# Patient Record
Sex: FEMALE | Race: WHITE | URBAN - METROPOLITAN AREA
[De-identification: names, ages, dates, MRNs, and addresses within clinical notes are randomized per-mention and may not be internally consistent; named-entity substitution may affect disease eponyms.]

---

## 2017-01-03 DIAGNOSIS — M06.09 RHEUMATOID ARTHRITIS OF MULTIPLE SITES WITHOUT RHEUMATOID FACTOR (H): Primary | ICD-10-CM

## 2017-01-03 RX ORDER — AZATHIOPRINE 50 MG/1
100 TABLET ORAL DAILY
Qty: 60 TABLET | Refills: 0 | Status: SHIPPED | OUTPATIENT
Start: 2017-01-03 | End: 2017-02-07

## 2017-01-03 NOTE — TELEPHONE ENCOUNTER
azaTHIOprine (IMURAN) 50 MG tablet      Last Written Prescription Date:  11/15/2016  Last Fill Quantity: 60,   # refills: 1  Last Office Visit: 12/8/2015  Future Office visit:  3/14/2017    CBC RESULTS:   Recent Labs   Lab Test  11/01/16   1507   WBC  4.9   RBC  4.23   HGB  13.3   HCT  39.6   MCV  94   MCH  31.4   MCHC  33.6   RDW  13.5   PLT  165       CREATININE   Date Value Ref Range Status   11/01/2016 0.89 0.57 - 1.11 mg/dL Final   ]    Liver Function Studies -   Recent Labs   Lab Test  11/01/16   1507  03/02/12  10/20/11   1635   PROTTOTAL   --    --    --   7.0   ALBUMIN  3.8   < >  3.4*  3.9   BILITOTAL   --    --    --   1.0   ALKPHOS   --    --   87  79   AST  23   < >  19  33   ALT  16   < >  31  10    < > = values in this interval not displayed.       Routing refill request to provider for review/approval because:  Criteria not met-  appointment overdue > 6 months since last seen.  Will pend Rx to provider per protocol for 1 month supply and no added refills.

## 2017-02-07 ENCOUNTER — TELEPHONE (OUTPATIENT)
Dept: RHEUMATOLOGY | Facility: CLINIC | Age: 52
End: 2017-02-07

## 2017-02-07 ENCOUNTER — MYC MEDICAL ADVICE (OUTPATIENT)
Dept: RHEUMATOLOGY | Facility: CLINIC | Age: 52
End: 2017-02-07

## 2017-02-07 DIAGNOSIS — Z85.828 HISTORY OF BASAL CELL CARCINOMA: ICD-10-CM

## 2017-02-07 DIAGNOSIS — M06.09 RHEUMATOID ARTHRITIS OF MULTIPLE SITES WITHOUT RHEUMATOID FACTOR (H): Primary | ICD-10-CM

## 2017-02-07 DIAGNOSIS — Z76.0 MEDICATION REFILL: ICD-10-CM

## 2017-02-07 RX ORDER — AZATHIOPRINE 50 MG/1
100 TABLET ORAL DAILY
Qty: 60 TABLET | Refills: 1 | Status: SHIPPED | OUTPATIENT
Start: 2017-02-07 | End: 2017-05-25

## 2017-02-07 RX ORDER — METHOTREXATE 25 MG/ML
18 INJECTION INTRA-ARTERIAL; INTRAMUSCULAR; INTRATHECAL; INTRAVENOUS WEEKLY
Qty: 2.8 ML | Refills: 1 | Status: SHIPPED | OUTPATIENT
Start: 2017-02-07 | End: 2017-06-19

## 2017-02-07 RX ORDER — AZATHIOPRINE 50 MG/1
TABLET ORAL
Qty: 120 TABLET | Refills: 0 | OUTPATIENT
Start: 2017-02-07

## 2017-02-07 NOTE — TELEPHONE ENCOUNTER
Please have her do labs 1 hour before she sees me, so I have them available for review.     Alternatively, she could see Lj even before then. She needs to be seen for sure.

## 2017-02-07 NOTE — TELEPHONE ENCOUNTER
Methotrexate      Last Written Prescription Date:  12-9-16  Last Fill Quantity: 2.8 ml,   # refills: 0        Azathioprine      Last Written Prescription Date:  1-3-17  Last Fill Quantity: 60,   # refills: 0  Last Office Visit: 12-8-2015  Future Office visit:  3-14-17    CBC RESULTS:   Recent Labs   Lab Test  11/01/16   1507   WBC  4.9   RBC  4.23   HGB  13.3   HCT  39.6   MCV  94   MCH  31.4   MCHC  33.6   RDW  13.5   PLT  165       CREATININE   Date Value Ref Range Status   11/01/2016 0.89 0.57 - 1.11 mg/dL Final   ]    Liver Function Studies -   Recent Labs   Lab Test  11/01/16   1507  03/02/12  10/20/11   1635   PROTTOTAL   --    --    --   7.0   ALBUMIN  3.8   < >  3.4*  3.9   BILITOTAL   --    --    --   1.0   ALKPHOS   --    --   87  79   AST  23   < >  19  33   ALT  16   < >  31  10    < > = values in this interval not displayed.       Kathleen M Doege RN

## 2017-02-07 NOTE — TELEPHONE ENCOUNTER
[facility] pharmacy called re: MTX Rx. Med comes in 2 mL vials. Pharmacist asking if Rx quantity can be 4, that [facility] call at [phone]. Message sent to [employer].

## 2017-02-13 ENCOUNTER — MYC MEDICAL ADVICE (OUTPATIENT)
Dept: RHEUMATOLOGY | Facility: CLINIC | Age: 52
End: 2017-02-13

## 2017-02-15 DIAGNOSIS — Z85.828 HISTORY OF BASAL CELL CARCINOMA: ICD-10-CM

## 2017-02-15 DIAGNOSIS — M06.9 RHEUMATOID ARTHRITIS (H): ICD-10-CM

## 2017-02-15 DIAGNOSIS — Z76.0 MEDICATION REFILL: ICD-10-CM

## 2017-02-23 ENCOUNTER — MYC MEDICAL ADVICE (OUTPATIENT)
Dept: RHEUMATOLOGY | Facility: CLINIC | Age: 52
End: 2017-02-23

## 2017-02-23 NOTE — TELEPHONE ENCOUNTER
Reschedule to Lj Robledo.  Michelle Rossi CMA (AAMA)  [facility] - Adult Rheumatology, [facility]

## 2017-03-12 ENCOUNTER — MYC MEDICAL ADVICE (OUTPATIENT)
Dept: RHEUMATOLOGY | Facility: CLINIC | Age: 52
End: 2017-03-12

## 2017-03-14 ENCOUNTER — OFFICE VISIT (OUTPATIENT)
Dept: RHEUMATOLOGY | Facility: CLINIC | Age: 52
End: 2017-03-14
Attending: INTERNAL MEDICINE
Payer: COMMERCIAL

## 2017-03-14 VITALS
WEIGHT: 208 LBS | BODY MASS INDEX: 35.7 KG/M2 | HEART RATE: 80 BPM | DIASTOLIC BLOOD PRESSURE: 81 MMHG | SYSTOLIC BLOOD PRESSURE: 145 MMHG | OXYGEN SATURATION: 97 %

## 2017-03-14 DIAGNOSIS — M19.90 INFLAMMATORY ARTHRITIS: ICD-10-CM

## 2017-03-14 DIAGNOSIS — M06.9 RHEUMATOID ARTHRITIS (H): ICD-10-CM

## 2017-03-14 DIAGNOSIS — M70.61 TROCHANTERIC BURSITIS OF RIGHT HIP: ICD-10-CM

## 2017-03-14 DIAGNOSIS — Z79.899 ENCOUNTER FOR LONG-TERM CURRENT USE OF MEDICATION: Primary | ICD-10-CM

## 2017-03-14 DIAGNOSIS — Z79.899 ENCOUNTER FOR LONG-TERM CURRENT USE OF MEDICATION: ICD-10-CM

## 2017-03-14 LAB
ALBUMIN SERPL-MCNC: 3.5 G/DL (ref 3.4–5)
ALT SERPL W P-5'-P-CCNC: 19 U/L (ref 0–50)
AST SERPL W P-5'-P-CCNC: 18 U/L (ref 0–45)
BASOPHILS # BLD AUTO: 0 10E9/L (ref 0–0.2)
BASOPHILS NFR BLD AUTO: 0.7 %
CREAT SERPL-MCNC: 0.92 MG/DL (ref 0.52–1.04)
CRP SERPL-MCNC: 3.8 MG/L (ref 0–8)
DIFFERENTIAL METHOD BLD: NORMAL
EOSINOPHIL # BLD AUTO: 0.2 10E9/L (ref 0–0.7)
EOSINOPHIL NFR BLD AUTO: 3.9 %
ERYTHROCYTE [DISTWIDTH] IN BLOOD BY AUTOMATED COUNT: 13 % (ref 10–15)
ERYTHROCYTE [SEDIMENTATION RATE] IN BLOOD BY WESTERGREN METHOD: 20 MM/H (ref 0–30)
GFR SERPL CREATININE-BSD FRML MDRD: 64 ML/MIN/1.7M2
HCT VFR BLD AUTO: 41.1 % (ref 35–47)
HGB BLD-MCNC: 14 G/DL (ref 11.7–15.7)
IMM GRANULOCYTES # BLD: 0 10E9/L (ref 0–0.4)
IMM GRANULOCYTES NFR BLD: 0.2 %
LYMPHOCYTES # BLD AUTO: 1 10E9/L (ref 0.8–5.3)
LYMPHOCYTES NFR BLD AUTO: 18 %
MCH RBC QN AUTO: 31.3 PG (ref 26.5–33)
MCHC RBC AUTO-ENTMCNC: 34.1 G/DL (ref 31.5–36.5)
MCV RBC AUTO: 92 FL (ref 78–100)
MONOCYTES # BLD AUTO: 0.4 10E9/L (ref 0–1.3)
MONOCYTES NFR BLD AUTO: 7.3 %
NEUTROPHILS # BLD AUTO: 3.7 10E9/L (ref 1.6–8.3)
NEUTROPHILS NFR BLD AUTO: 69.9 %
NRBC # BLD AUTO: 0 10*3/UL
NRBC BLD AUTO-RTO: 0 /100
PLATELET # BLD AUTO: 161 10E9/L (ref 150–450)
RBC # BLD AUTO: 4.47 10E12/L (ref 3.8–5.2)
WBC # BLD AUTO: 5.3 10E9/L (ref 4–11)

## 2017-03-14 PROCEDURE — 85652 RBC SED RATE AUTOMATED: CPT | Performed by: INTERNAL MEDICINE

## 2017-03-14 PROCEDURE — 82565 ASSAY OF CREATININE: CPT | Performed by: INTERNAL MEDICINE

## 2017-03-14 PROCEDURE — 36415 COLL VENOUS BLD VENIPUNCTURE: CPT | Performed by: INTERNAL MEDICINE

## 2017-03-14 PROCEDURE — 86140 C-REACTIVE PROTEIN: CPT | Performed by: INTERNAL MEDICINE

## 2017-03-14 PROCEDURE — 84460 ALANINE AMINO (ALT) (SGPT): CPT | Performed by: INTERNAL MEDICINE

## 2017-03-14 PROCEDURE — 84450 TRANSFERASE (AST) (SGOT): CPT | Performed by: INTERNAL MEDICINE

## 2017-03-14 PROCEDURE — 85025 COMPLETE CBC W/AUTO DIFF WBC: CPT | Performed by: INTERNAL MEDICINE

## 2017-03-14 PROCEDURE — 82040 ASSAY OF SERUM ALBUMIN: CPT | Performed by: INTERNAL MEDICINE

## 2017-03-14 PROCEDURE — 99212 OFFICE O/P EST SF 10 MIN: CPT

## 2017-03-14 ASSESSMENT — PAIN SCALES - GENERAL: PAINLEVEL: NO PAIN (0)

## 2017-03-14 NOTE — PROGRESS NOTES
Rheumatology Visit     Jodie Dailey MRN# [id number]   YOB: 1965 Age: 50 year old     Primary care provider: JEREMIAH VILLAVICENCIO-  Primary Rheumatologist: Dr. Ubaldo Montoya MD   Immunizations: Live vaccines verify with CDC guidelines if safe to take (including shingles vaccine).  Update immunizations per CDC guidelines recommendations for immunosuppressed patients.           Active Problem List:   Patient Active Problem List    Diagnosis Date Noted     History of basal cell carcinoma 12/08/2015     Priority: Medium     Has history of BCC on nose 03/2014, follows with Dermatology        Iron deficiency anemia 03/13/2014     Priority: Medium     Overview:   2/05  Secondary to DUB       Lyme disease 03/13/2014     Priority: Medium     Overview:   Positive - treated -         Restless legs syndrome 03/13/2014     Priority: Medium     Overview:   11/05       Hemorrhoids 03/13/2014     Priority: Medium     Encounter for long-term current use of medication 05/01/2012     Priority: Medium     Problem list name updated by automated process. Provider to review       Chronic sinusitis 10/15/2008     Priority: Medium     Overview:   chronic disease  1/08 Dr. Pierre, sinus CT       Endocrine disorder 03/18/2008     Priority: Medium     Overview:   Replaced at [facility] [place], [place]              Assessment:     1. Sero-positive RA (-RF, -SUSHIL +CCP low positive 60 in 5/2012 outside lab, xrays 3/2014 No erosions)   Assessment: Patient is doing well on current regimen and there is no disease activity on physical exam and repeat labs from 11/30 are normal. Will continue with methotrexate 17.5mg week, aziothioprine 100mg day. If she experiences flares in the future, options would be to stop imuran and try orencia should she flare. Of note, recent basal cell cancer on nose 3-2014-will schedule with derm. Labs 7-2015 NL. See RA measures=low/remission. Based on this, continue current plan  --Methotrexate 17.5mg SQ q7days, aziothioprine 100mg  day, folic acid.   --DMARD labs every 8 weeks. No alcohol. Hold if any infections, fever, chills or cough  --yearly derm visit hx basal cell skin cancer     2. OA of the knees (needs TKR L>R). [facility]    3. History of Pleural Effusions: Stable no symptoms now. Has stable SOB w/going up one flight of stairs. Hx scarring on lungs after past pneumonia.   4. Hx of Basal Cell Carcinoma -    PLAN:  She really seems to be doing very well.  There is at most some trace synovitis in her right wrist.      On exam she has lateral tenderness consistent with right-sided trochanteric bursitis/iliotibial band syndrome.  This is actually pretty mild based on both her symptoms and her exam, so I gave her some printouts of exercises to try.  If that is not working adequately, we will get her set up with Physical Therapy.       She is doing well enough otherwise I think she can continue to do her labs as scheduled and will see Lj, our nurse practitioner, back in 6 months, sooner p.r.n.              History of Present Illness:   Jodie Dailey is a 50 year old female who presents with sero-positive RA (Neg SUSHIL, no erosions on xray 3/2014, -RF/ low positive 60 CCP in 5/2012). Continues with methotrexate 17.5mg week, azothioprine 100mg day [decreased 10/2011 from 150mg day] folic acid, medrol dose pack May 2012. Previous low albumin 2.2 on 10/2011 normalized repeat with nl work-up. Last seen 7-2014 Dr. Montoya  Interval Hx   08/2015    Stable RA and PFT.   EAS none. No vasculitic lesions over the lower extremities. Denies any interval changes, no infections, fever, chills, cough or night sweats. No EAS. Past seen [facility] for knee left meniscal tear-will need TKR and this repaired which does not wish to do. Hx R patella dislocated needed adjustment. No other joint complaints of pains or swelling. Feels RA controlled.     Denies CP, SOB, angina or neurological symptoms. No changing skin lesions. Due for physical but  does think she had DEXA. No change in PMSH. No OTC medications     At times, past scarring from pneumonia will have more SOB with FOS at work. No symptoms now. Cardiac work-up negative 2011. Previously told it was scar tissue in 1999. Notably she quit tobacco over 20 years ago.     12/08/2015     Patient feels that her symptoms are well controlled. She states that she will experience about 15 minutes of joint stiffness 1-2 times per week but has very little pain in her MCPs or PCPs. She has not noted any swelling or erythema of the joints.     She does have a small lesion that broke out under the right nare last week. It was pustular and drained serous fluid. It is getting smaller and healing but is slightly painful to palpation. No other rashes or lesions anywhere else noted.     She states that her baseline shortness of breath is not worse. Her baseline is that she can walk up 1 flight of stairs and she will get a little short of breath.     She states she noted some floaters in her left eye recently. Had eye exam and was told that everything was normal. Do not see report of this. She is not having any pain with eye movement or redness of the eye.     She is endorsing some night sweats but she is currently going through menopause.     No fevers, chills or unintentional weight loss. No dry mouth, dry eyes. No abdominal pain, nausea or vomiting. No diarrhea or constipation. No chest pain. No muscle pain.     MARCH 14, 2017, INTERVAL HISTORY:  Since we have last seen the patient, it has been about 15 months.  She really feels she has been pretty stable overall.  In general, she has had no joint with more than 15 minutes of morning stiffness.  She did have some pain over the last several months, particularly in her right shoulder with certain extremes of range of motion, but she thinks she is doing pretty well today and cannot necessarily reproduce those.  Her right foot will hurt her at times and the most persistent  of her pains is lateral pain over the right hip.  She tends to sleep on that every night, and her description and location of it would be consistent with trochanteric bursitis.       She did also recently get her labs done.  She remains on the methotrexate at 18 mg weekly together with Imuran 100 mg q. day.  On that combination, she has no laboratory abnormalities whatsoever and her labs have been completely stable over the last year.  She also denies any fevers, chills, or signs or symptoms of infection.           Past Medical History:   Past Medical History   Diagnosis Date     Anemia      History.      Basal cell cancer 3/2014     Benign neoplasm of breast 01/28/1999     L     HTN (hypertension)      On treatment     Left ankle injury 11/2010     Twisted ankle (resolved). MRI 11/2010     Left knee DJD 11/2010     Seen orthopedic [facility] (MRI showing DJD needing TKR)     Leucocytoclastic vasculitis (H) 2009     After Humir, no recurrent lesions of sx. Probable drug induced     Nasal polyps      Obesity, unspecified      minimal     Pleural effusions 1999     At initial dx of RA. Not worse. SOB with stairs. Cardiac work-up negative. Past told was from scar tissure in lungs 1999.      Pneumonia 1999     Post term pregnancy, delivered with or without mention of antepartum condition      childbirth x 2     Premenopausal patient      Hot flashes.      Pure hypercholesterolemia      elevated 1998     RA (rheumatoid arthritis) (H) 1999     +anti-CCP 60 5/2012; 3/2008 x-rays no erosions hands/feet     No Blood transfusions         Past Surgical History:   Past Surgical History   Procedure Laterality Date      general surg (abstracted)  0250-4314     right knee surgery ,knee cap     Hc place needle wire preop, breast, initial  01/28/1999     L     C stereotactic breast biopsy  01/28/1999     L     Hc exc benign skin lesion trunk/arm/leg 0.6-1.0 cm  01/28/1999     R axilla     Hc excision breast lesion, open >=1   "01/28/1999     L     Rw other (abstracted)  2001     sinus surgery,polyps removed               Social History:   Social History     Social History     Marital status:      Spouse name: N/A     Number of children: N/A     Years of education: N/A     Occupational History     Not on file.     Social History Main Topics     Smoking status: Never Smoker     Smokeless tobacco: Never Used     Alcohol use No      Comment: rare     Drug use: No     Sexual activity: Not on file     Other Topics Concern     Blood Transfusions No     Sleep Concern No     Stress Concern No     Weight Concern Yes     Special Diet No     Back Care No     Exercise Yes     Seat Belt Yes     Self-Exams Yes     Social History Narrative    Works in airline               Family History:   Family History   Problem Relation Age of Onset     CANCER Maternal Grandmother      Brain, lung     DIABETES No family hx of      10 brothers and sisters      HEART DISEASE Paternal Grandmother      MI, asthma     Other - See Comments Brother      brother and niece with celiac     No autoimmune disorders  No MS         Allergies:   Allergies   Allergen Reactions     No Known Allergies      Seasonal Allergies      Ragweed, dust mites               Medications:   Current Outpatient Prescriptions   Medication Sig Dispense Refill     tuberculin-allergy syringes (B-D TB SYRINGE) 27G X 1/2\" 1 ML MISC USE AS DIRECTED FOR WEEKLY INJECTIONS OF METHOTREXATE 100 each 0     methotrexate sodium, pres-free, 50 MG/2ML SOLN injection CHEMO Inject 0.72 mLs (18 mg) Subcutaneous once a week 2.8 mL 1     azaTHIOprine (IMURAN) 50 MG tablet Take 2 tablets (100 mg) by mouth daily 60 tablet 1     folic acid (FOLVITE) 1 MG tablet Take 1 tablet (1 mg) by mouth 2 times daily 180 tablet 0     hydrochlorothiazide (HYDRODIURIL) 25 MG tablet Take 1 tablet by mouth daily.       Calcium Carbonate-Vitamin D (CALCIUM 500 + D PO) Take 1 tablet by mouth daily.       Cyanocobalamin (VITAMIN B12 PO) " Take 1 tablet by mouth daily.       Pyridoxine HCl (B-6 PO) Take 1 tablet by mouth daily.              Review of Systems: See MDHAQ   Pertinent review of systems are negative for sun sensitivities, keratoconjunctivitis sicca, pleurisy, significant rashes or ulcerations, inflammatory eye disease, inflammatory bowel disease, dactylitis, tenosynovitis, or enthespathy.   CONSTITUTIONAL: No fevers, night sweats or unintentional weight change. No acute distress  EYES: Neg  EARS, NOSE, MOUTH, THROAT: Neg  CARDIOVASCULAR: No chest pain, palpitations, or pain with walking, no orthopnea or PND   RESPIRATORY: No dyspnea, cough, shortness of breath or wheezing. No pleurisy.   GI: Neg  : Neg  MUSCKL: negative other than what stated in HPI  INTEGUMENTARY: No concerning lesions or moles   NEURO: Neg  ENDO: Neg  HEME/LYMPH:No concerning bumps, bleeding problems, or swollen lymph nodes. No recent infections, hospitalizations.   Otherwise 14 point ROS obtained, reviewed and found negative.           Physical Exam:   Blood pressure 145/81, pulse 80, weight 94.3 kg (208 lb), SpO2 97 %.  Wt Readings from Last 4 Encounters:   03/14/17 94.3 kg (208 lb)   12/08/15 84.4 kg (186 lb)   08/18/15 81.6 kg (180 lb)   12/02/14 86.4 kg (190 lb 6.4 oz)     Constitutional: WD-WN-WG cooperative  Eyes: nl EOM, PERRLA, vision, conjunctiva, sclera  ENT: nl external ears, nose, hearing, lips, teeth, gums, throat. Nl saliva pool  No mucous membrane lesions, normal saliva pool, small erythematous, healing lesion under the right nare without drainage  Neck: no mass or thyroid enlargement. non-tender.  Resp: lungs clear to auscultation, nl to palpation, nl breath sounds   CV: RRR, no murmurs, rubs or gallops, no edema  GI: no ABD mass or tenderness, no HSM. No RUQ pain.   : not tested  Lymph: no cervical, supraclavicular, inguinal or epitrochlear nodes  MS: Trace right wrist swelling; No other  convincing areas of synovitis in all TMJ, neck, shoulder,  elbow, wrist, MCP/PIP/DIP, spine, hip, knee, ankle, and foot MTP/IP joints were examined no swelling and FROM.  Normal  strength. No dactylitis,  tenosynovitis, enthespathy. Negative MCP and MTP squeeze.   Skin: no nail pitting, alopecia, rash, nodules. Chronic hyperpigmentation changes from old vasculitis.    Neuro: nl cranial nerves, strength, sensation  Psych: nl judgement, orientation, memory, affect.         Labs/Imaging:   Reviewed medications, labs, imaging, and EMR.   Reviewed Rheumatology Lab Flowsheet & Lab Flowsheet    11/30/2105 done [facility] CBC, creat, ALT, AST, CRP and ESR    Results for orders placed or performed in visit on 03/14/17   Albumin level   Result Value Ref Range    Albumin 3.5 3.4 - 5.0 g/dL   ALT   Result Value Ref Range    ALT 19 0 - 50 U/L   AST   Result Value Ref Range    AST 18 0 - 45 U/L   CBC with platelets differential   Result Value Ref Range    WBC 5.3 4.0 - 11.0 10e9/L    RBC Count 4.47 3.8 - 5.2 10e12/L    Hemoglobin 14.0 11.7 - 15.7 g/dL    Hematocrit 41.1 35.0 - 47.0 %    MCV 92 78 - 100 fl    MCH 31.3 26.5 - 33.0 pg    MCHC 34.1 31.5 - 36.5 g/dL    RDW 13.0 10.0 - 15.0 %    Platelet Count 161 150 - 450 10e9/L    Diff Method Automated Method     % Neutrophils 69.9 %    % Lymphocytes 18.0 %    % Monocytes 7.3 %    % Eosinophils 3.9 %    % Basophils 0.7 %    % Immature Granulocytes 0.2 %    Nucleated RBCs 0 0 /100    Absolute Neutrophil 3.7 1.6 - 8.3 10e9/L    Absolute Lymphocytes 1.0 0.8 - 5.3 10e9/L    Absolute Monocytes 0.4 0.0 - 1.3 10e9/L    Absolute Eosinophils 0.2 0.0 - 0.7 10e9/L    Absolute Basophils 0.0 0.0 - 0.2 10e9/L    Abs Immature Granulocytes 0.0 0 - 0.4 10e9/L    Absolute Nucleated RBC 0.0    Creatinine   Result Value Ref Range    Creatinine 0.92 0.52 - 1.04 mg/dL    GFR Estimate 64 >60 mL/min/1.7m2    GFR Estimate If Black 77 >60 mL/min/1.7m2   Erythrocyte sedimentation rate auto   Result Value Ref Range    Sed Rate 20 0 - 30 mm/h   CRP inflammation    Result Value Ref Range    CRP Inflammation 3.8 0.0 - 8.0 mg/L       Recent Labs   Lab Test  03/14/17   1439  11/01/16   1507  06/23/16   1444   10/28/13   1103  03/02/12  10/20/11   1635 10/17/11   WBC  5.3  4.9  6.0   < >  4.6   < >  8.1  6.1  6.4   HGB  14.0  13.3  13.9   < >  13.7   < >  13.9  13.7  13.8   HCT  41.1  39.6  40.1   < >  39.3   < >  39.5  39.4  40.3   MCV  92  94  93   < >  93   < >  89  93  92   PLT  161  165  157   < >  146*   < >  157  164  159   BUN   --    --    --    --   15   --    --    --    --    AST  18  23  23   < >  34   < >  19  33  24*   ALT  19  16  13   < >  40   < >  31  10  9*   GGT   --    --    --    --    --    --    --   11   --    ALKPHOS   --    --    --    --    --    --   87  79  86    < > = values in this interval not displayed.

## 2017-03-14 NOTE — NURSING NOTE
"Chief Complaint   Patient presents with     RECHECK     RA       Initial /81  Pulse 80  Wt 94.3 kg (208 lb)  SpO2 97%  BMI 35.7 kg/m2 Estimated body mass index is 35.7 kg/(m^2) as calculated from the following:    Height as of 12/8/15: 1.626 m (5' 4\").    Weight as of this encounter: 94.3 kg (208 lb).  Medication Reconciliation: complete    "

## 2017-03-14 NOTE — MR AVS SNAPSHOT
After Visit Summary   3/14/2017    Jodie Dailey    MRN: [id number]           Patient Information     Date Of Birth          1965        Visit Information        Provider Department      3/14/2017 3:30 PM Ubaldo Montoya MD [facility]         Follow-ups after your visit        Your next 10 appointments already scheduled     Sep 14, 2017  2:30 PM CDT   (Arrive by 2:15 PM)   Return Visit with QI Narayanan CNP   [facility] ([facility])    [address] [place] [id number] [phone]              Who to contact     If you have questions or need follow up information about today's clinic visit or your schedule please contact [facility] directly at [phone].  Normal or non-critical lab and imaging results will be communicated to you by MyChart, letter or phone within 4 business days after the clinic has received the results. If you do not hear from us within 7 days, please contact the clinic through MyChart or phone. If you have a critical or abnormal lab result, we will notify you by phone as soon as possible.  Submit refill requests through [employer] or call your pharmacy and they will forward the refill request to us. Please allow 3 business days for your refill to be completed.          Additional Information About Your Visit        MyChart Information     [employer] gives you secure access to your electronic health record. If you see a primary care provider, you can also send messages to your care team and make appointments. If you have questions, please call your primary care clinic.  If you do not have a primary care provider, please call [phone] and they will assist you.        Care EveryWhere ID     This is your Care EveryWhere ID. This could be used by other organizations to access your [facility] medical records  [id number]        Your Vitals Were     Pulse Pulse Oximetry BMI (Body Mass  "Index)             80 97% 35.7 kg/m2          Blood Pressure from Last 3 Encounters:   03/14/17 145/81   12/08/15 184/86   08/18/15 146/87    Weight from Last 3 Encounters:   03/14/17 94.3 kg (208 lb)   12/08/15 84.4 kg (186 lb)   08/18/15 81.6 kg (180 lb)              Today, you had the following     No orders found for display       Primary Care Provider Office Phone # Fax #    Rommel Hector [phone]       [facility] [address] [address]        Thank you!     Thank you for choosing [facility]  for your care. Our goal is always to provide you with excellent care. Hearing back from our patients is one way we can continue to improve our services. Please take a few minutes to complete the written survey that you may receive in the mail after your visit with us. Thank you!             Your Updated Medication List - Protect others around you: Learn how to safely use, store and throw away your medicines at www.disposemymeds.org.          This list is accurate as of: 3/14/17  4:02 PM.  Always use your most recent med list.                   Brand Name Dispense Instructions for use    azaTHIOprine 50 MG tablet    IMURAN    60 tablet    Take 2 tablets (100 mg) by mouth daily       B-6 PO      Take 1 tablet by mouth daily.       CALCIUM 500 + D PO      Take 1 tablet by mouth daily.       folic acid 1 MG tablet    FOLVITE    180 tablet    Take 1 tablet (1 mg) by mouth 2 times daily       hydrochlorothiazide 25 MG tablet    HYDRODIURIL     Take 1 tablet by mouth daily.       methotrexate sodium (pres-free) 50 MG/2ML Soln injection CHEMO     2.8 mL    Inject 0.72 mLs (18 mg) Subcutaneous once a week       tuberculin-allergy syringes 27G X 1/2\" 1 ML Misc    B-D TB SYRINGE    100 each    USE AS DIRECTED FOR WEEKLY INJECTIONS OF METHOTREXATE       VITAMIN B12 PO      Take 1 tablet by mouth daily.         "

## 2017-03-14 NOTE — LETTER
3/14/2017      RE: Jodie Dailey  [address]  [place] [address]       Rheumatology Visit     Jodie Dailey MRN# [id number]   YOB: 1965 Age: 50 year old     Primary care provider: JEREMIAH VILLAVICENCIO-  Primary Rheumatologist: Dr. Ubaldo Montoya MD   Immunizations: Live vaccines verify with CDC guidelines if safe to take (including shingles vaccine).  Update immunizations per CDC guidelines recommendations for immunosuppressed patients.           Active Problem List:   Patient Active Problem List    Diagnosis Date Noted     History of basal cell carcinoma 12/08/2015     Priority: Medium     Has history of BCC on nose 03/2014, follows with Dermatology        Iron deficiency anemia 03/13/2014     Priority: Medium     Overview:   2/05  Secondary to DUB       Lyme disease 03/13/2014     Priority: Medium     Overview:   Positive - treated -         Restless legs syndrome 03/13/2014     Priority: Medium     Overview:   11/05       Hemorrhoids 03/13/2014     Priority: Medium     Encounter for long-term current use of medication 05/01/2012     Priority: Medium     Problem list name updated by automated process. Provider to review       Chronic sinusitis 10/15/2008     Priority: Medium     Overview:   chronic disease  1/08 Dr. Pierre, sinus CT       Endocrine disorder 03/18/2008     Priority: Medium     Overview:   Replaced at [facility]              Assessment:     1. Sero-positive RA (-RF, -SUSHIL +CCP low positive 60 in 5/2012 outside lab, xrays 3/2014 No erosions)   Assessment: Patient is doing well on current regimen and there is no disease activity on physical exam and repeat labs from 11/30 are normal. Will continue with methotrexate 17.5mg week, aziothioprine 100mg day. If she experiences flares in the future, options would be to stop imuran and try orencia should she flare. Of note, recent basal cell cancer on nose 3-2014-will schedule with derm. Labs 7-2015 NL. See RA measures=low/remission. Based on  this, continue current plan  --Methotrexate 17.5mg SQ q7days, aziothioprine 100mg day, folic acid.   --DMARD labs every 8 weeks. No alcohol. Hold if any infections, fever, chills or cough  --yearly derm visit hx basal cell skin cancer     2. OA of the knees (needs TKR L>R). [facility]    3. History of Pleural Effusions: Stable no symptoms now. Has stable SOB w/going up one flight of stairs. Hx scarring on lungs after past pneumonia.   4. Hx of Basal Cell Carcinoma -    PLAN:  She really seems to be doing very well.  There is at most some trace synovitis in her right wrist.      On exam she has lateral tenderness consistent with right-sided trochanteric bursitis/iliotibial band syndrome.  This is actually pretty mild based on both her symptoms and her exam, so I gave her some printouts of exercises to try.  If that is not working adequately, we will get her set up with Physical Therapy.       She is doing well enough otherwise I think she can continue to do her labs as scheduled and will see Lj, our nurse practitioner, back in 6 months, sooner p.r.n.              History of Present Illness:   Jodie Dailey is a 50 year old female who presents with sero-positive RA (Neg SUSHIL, no erosions on xray 3/2014, -RF/ low positive 60 CCP in 5/2012). Continues with methotrexate 17.5mg week, azothioprine 100mg day [decreased 10/2011 from 150mg day] folic acid, medrol dose pack May 2012. Previous low albumin 2.2 on 10/2011 normalized repeat with nl work-up. Last seen 7-2014 Dr. Montoya  Interval Hx   08/2015    Stable RA and PFT.   EAS none. No vasculitic lesions over the lower extremities. Denies any interval changes, no infections, fever, chills, cough or night sweats. No EAS. Past seen [facility] for knee left meniscal tear-will need TKR and this repaired which does not wish to do. Hx R patella dislocated needed adjustment. No other joint complaints of pains or swelling. Feels RA controlled.     Denies CP, SOB,  angina or neurological symptoms. No changing skin lesions. Due for physical but does think she had DEXA. No change in PMSH. No OTC medications     At times, past scarring from pneumonia will have more SOB with FOS at work. No symptoms now. Cardiac work-up negative 2011. Previously told it was scar tissue in 1999. Notably she quit tobacco over 20 years ago.     12/08/2015     Patient feels that her symptoms are well controlled. She states that she will experience about 15 minutes of joint stiffness 1-2 times per week but has very little pain in her MCPs or PCPs. She has not noted any swelling or erythema of the joints.     She does have a small lesion that broke out under the right nare last week. It was pustular and drained serous fluid. It is getting smaller and healing but is slightly painful to palpation. No other rashes or lesions anywhere else noted.     She states that her baseline shortness of breath is not worse. Her baseline is that she can walk up 1 flight of stairs and she will get a little short of breath.     She states she noted some floaters in her left eye recently. Had eye exam and was told that everything was normal. Do not see report of this. She is not having any pain with eye movement or redness of the eye.     She is endorsing some night sweats but she is currently going through menopause.     No fevers, chills or unintentional weight loss. No dry mouth, dry eyes. No abdominal pain, nausea or vomiting. No diarrhea or constipation. No chest pain. No muscle pain.     MARCH 14, 2017, INTERVAL HISTORY:  Since we have last seen the patient, it has been about 15 months.  She really feels she has been pretty stable overall.  In general, she has had no joint with more than 15 minutes of morning stiffness.  She did have some pain over the last several months, particularly in her right shoulder with certain extremes of range of motion, but she thinks she is doing pretty well today and cannot necessarily  reproduce those.  Her right foot will hurt her at times and the most persistent of her pains is lateral pain over the right hip.  She tends to sleep on that every night, and her description and location of it would be consistent with trochanteric bursitis.       She did also recently get her labs done.  She remains on the methotrexate at 18 mg weekly together with Imuran 100 mg q. day.  On that combination, she has no laboratory abnormalities whatsoever and her labs have been completely stable over the last year.  She also denies any fevers, chills, or signs or symptoms of infection.           Past Medical History:   Past Medical History   Diagnosis Date     Anemia      History.      Basal cell cancer 3/2014     Benign neoplasm of breast 01/28/1999     L     HTN (hypertension)      On treatment     Left ankle injury 11/2010     Twisted ankle (resolved). MRI 11/2010     Left knee DJD 11/2010     Seen orthopedic [facility] (MRI showing DJD needing TKR)     Leucocytoclastic vasculitis (H) 2009     After Humir, no recurrent lesions of sx. Probable drug induced     Nasal polyps      Obesity, unspecified      minimal     Pleural effusions 1999     At initial dx of RA. Not worse. SOB with stairs. Cardiac work-up negative. Past told was from scar tissure in lungs 1999.      Pneumonia 1999     Post term pregnancy, delivered with or without mention of antepartum condition      childbirth x 2     Premenopausal patient      Hot flashes.      Pure hypercholesterolemia      elevated 1998     RA (rheumatoid arthritis) (H) 1999     +anti-CCP 60 5/2012; 3/2008 x-rays no erosions hands/feet     No Blood transfusions         Past Surgical History:   Past Surgical History   Procedure Laterality Date     Rw general surg (abstracted)  [id number]     right knee surgery ,knee cap     Hc place needle wire preop, breast, initial  01/28/1999     L     C stereotactic breast biopsy  01/28/1999     L     Hc exc benign skin lesion trunk/arm/leg  "0.6-1.0 cm  01/28/1999     R axilla     Hc excision breast lesion, open >=1  01/28/1999     L     Rw other (abstracted)  2001     sinus surgery,polyps removed               Social History:   Social History     Social History     Marital status:      Spouse name: N/A     Number of children: N/A     Years of education: N/A     Occupational History     Not on file.     Social History Main Topics     Smoking status: Never Smoker     Smokeless tobacco: Never Used     Alcohol use No      Comment: rare     Drug use: No     Sexual activity: Not on file     Other Topics Concern     Blood Transfusions No     Sleep Concern No     Stress Concern No     Weight Concern Yes     Special Diet No     Back Care No     Exercise Yes     Seat Belt Yes     Self-Exams Yes     Social History Narrative    Works in airline               Family History:   Family History   Problem Relation Age of Onset     CANCER Maternal Grandmother      Brain, lung     DIABETES No family hx of      10 brothers and sisters      HEART DISEASE Paternal Grandmother      MI, asthma     Other - See Comments Brother      brother and niece with celiac     No autoimmune disorders  No MS         Allergies:   Allergies   Allergen Reactions     No Known Allergies      Seasonal Allergies      Ragweed, dust mites               Medications:   Current Outpatient Prescriptions   Medication Sig Dispense Refill     tuberculin-allergy syringes (B-D TB SYRINGE) 27G X 1/2\" 1 ML MISC USE AS DIRECTED FOR WEEKLY INJECTIONS OF METHOTREXATE 100 each 0     methotrexate sodium, pres-free, 50 MG/2ML SOLN injection CHEMO Inject 0.72 mLs (18 mg) Subcutaneous once a week 2.8 mL 1     azaTHIOprine (IMURAN) 50 MG tablet Take 2 tablets (100 mg) by mouth daily 60 tablet 1     folic acid (FOLVITE) 1 MG tablet Take 1 tablet (1 mg) by mouth 2 times daily 180 tablet 0     hydrochlorothiazide (HYDRODIURIL) 25 MG tablet Take 1 tablet by mouth daily.       Calcium Carbonate-Vitamin D (CALCIUM " 500 + D PO) Take 1 tablet by mouth daily.       Cyanocobalamin (VITAMIN B12 PO) Take 1 tablet by mouth daily.       Pyridoxine HCl (B-6 PO) Take 1 tablet by mouth daily.              Review of Systems: See MDHAQ   Pertinent review of systems are negative for sun sensitivities, keratoconjunctivitis sicca, pleurisy, significant rashes or ulcerations, inflammatory eye disease, inflammatory bowel disease, dactylitis, tenosynovitis, or enthespathy.   CONSTITUTIONAL: No fevers, night sweats or unintentional weight change. No acute distress  EYES: Neg  EARS, NOSE, MOUTH, THROAT: Neg  CARDIOVASCULAR: No chest pain, palpitations, or pain with walking, no orthopnea or PND   RESPIRATORY: No dyspnea, cough, shortness of breath or wheezing. No pleurisy.   GI: Neg  : Neg  MUSCKL: negative other than what stated in HPI  INTEGUMENTARY: No concerning lesions or moles   NEURO: Neg  ENDO: Neg  HEME/LYMPH:No concerning bumps, bleeding problems, or swollen lymph nodes. No recent infections, hospitalizations.   Otherwise 14 point ROS obtained, reviewed and found negative.           Physical Exam:   Blood pressure 145/81, pulse 80, weight 94.3 kg (208 lb), SpO2 97 %.  Wt Readings from Last 4 Encounters:   03/14/17 94.3 kg (208 lb)   12/08/15 84.4 kg (186 lb)   08/18/15 81.6 kg (180 lb)   12/02/14 86.4 kg (190 lb 6.4 oz)     Constitutional: WD-WN-WG cooperative  Eyes: nl EOM, PERRLA, vision, conjunctiva, sclera  ENT: nl external ears, nose, hearing, lips, teeth, gums, throat. Nl saliva pool  No mucous membrane lesions, normal saliva pool, small erythematous, healing lesion under the right nare without drainage  Neck: no mass or thyroid enlargement. non-tender.  Resp: lungs clear to auscultation, nl to palpation, nl breath sounds   CV: RRR, no murmurs, rubs or gallops, no edema  GI: no ABD mass or tenderness, no HSM. No RUQ pain.   : not tested  Lymph: no cervical, supraclavicular, inguinal or epitrochlear nodes  MS: Trace right wrist  swelling; No other  convincing areas of synovitis in all TMJ, neck, shoulder, elbow, wrist, MCP/PIP/DIP, spine, hip, knee, ankle, and foot MTP/IP joints were examined no swelling and FROM.  Normal  strength. No dactylitis,  tenosynovitis, enthespathy. Negative MCP and MTP squeeze.   Skin: no nail pitting, alopecia, rash, nodules. Chronic hyperpigmentation changes from old vasculitis.    Neuro: nl cranial nerves, strength, sensation  Psych: nl judgement, orientation, memory, affect.         Labs/Imaging:   Reviewed medications, labs, imaging, and EMR.   Reviewed Rheumatology Lab Flowsheet & Lab Flowsheet    11/30/2105 done [facility] CBC, creat, ALT, AST, CRP and ESR    Results for orders placed or performed in visit on 03/14/17   Albumin level   Result Value Ref Range    Albumin 3.5 3.4 - 5.0 g/dL   ALT   Result Value Ref Range    ALT 19 0 - 50 U/L   AST   Result Value Ref Range    AST 18 0 - 45 U/L   CBC with platelets differential   Result Value Ref Range    WBC 5.3 4.0 - 11.0 10e9/L    RBC Count 4.47 3.8 - 5.2 10e12/L    Hemoglobin 14.0 11.7 - 15.7 g/dL    Hematocrit 41.1 35.0 - 47.0 %    MCV 92 78 - 100 fl    MCH 31.3 26.5 - 33.0 pg    MCHC 34.1 31.5 - 36.5 g/dL    RDW 13.0 10.0 - 15.0 %    Platelet Count 161 150 - 450 10e9/L    Diff Method Automated Method     % Neutrophils 69.9 %    % Lymphocytes 18.0 %    % Monocytes 7.3 %    % Eosinophils 3.9 %    % Basophils 0.7 %    % Immature Granulocytes 0.2 %    Nucleated RBCs 0 0 /100    Absolute Neutrophil 3.7 1.6 - 8.3 10e9/L    Absolute Lymphocytes 1.0 0.8 - 5.3 10e9/L    Absolute Monocytes 0.4 0.0 - 1.3 10e9/L    Absolute Eosinophils 0.2 0.0 - 0.7 10e9/L    Absolute Basophils 0.0 0.0 - 0.2 10e9/L    Abs Immature Granulocytes 0.0 0 - 0.4 10e9/L    Absolute Nucleated RBC 0.0    Creatinine   Result Value Ref Range    Creatinine 0.92 0.52 - 1.04 mg/dL    GFR Estimate 64 >60 mL/min/1.7m2    GFR Estimate If Black 77 >60 mL/min/1.7m2   Erythrocyte sedimentation rate  auto   Result Value Ref Range    Sed Rate 20 0 - 30 mm/h   CRP inflammation   Result Value Ref Range    CRP Inflammation 3.8 0.0 - 8.0 mg/L       Recent Labs   Lab Test  03/14/17   1439  11/01/16   1507  06/23/16   1444   10/28/13   1103  03/02/12  10/20/11   1635 10/17/11   WBC  5.3  4.9  6.0   < >  4.6   < >  8.1  6.1  6.4   HGB  14.0  13.3  13.9   < >  13.7   < >  13.9  13.7  13.8   HCT  41.1  39.6  40.1   < >  39.3   < >  39.5  39.4  40.3   MCV  92  94  93   < >  93   < >  89  93  92   PLT  161  165  157   < >  146*   < >  157  164  159   BUN   --    --    --    --   15   --    --    --    --    AST  18  23  23   < >  34   < >  19  33  24*   ALT  19  16  13   < >  40   < >  31  10  9*   GGT   --    --    --    --    --    --    --   11   --    ALKPHOS   --    --    --    --    --    --   87  79  86    < > = values in this interval not displayed.   Ubaldo Montoya MD

## 2017-04-17 DIAGNOSIS — M06.09 RHEUMATOID ARTHRITIS OF MULTIPLE SITES WITHOUT RHEUMATOID FACTOR (H): ICD-10-CM

## 2017-04-18 RX ORDER — AZATHIOPRINE 50 MG/1
100 TABLET ORAL DAILY
Qty: 120 TABLET | Refills: 0 | Status: SHIPPED | OUTPATIENT
Start: 2017-04-18 | End: 2017-05-25

## 2017-04-18 NOTE — TELEPHONE ENCOUNTER
azaTHIOprine (IMURAN) 50 MG      Last Written Prescription Date:  2/7/17  Last Fill Quantity: 60,   # refills: 1  Last Office Visit: 3/14/17  Future Office visit:  9/14/17    CBC RESULTS:   Recent Labs   Lab Test  03/14/17   1439   WBC  5.3   RBC  4.47   HGB  14.0   HCT  41.1   MCV  92   MCH  31.3   MCHC  34.1   RDW  13.0   PLT  161       Creatinine   Date Value Ref Range Status   03/14/2017 0.92 0.52 - 1.04 mg/dL Final   ]    Liver Function Studies -   Recent Labs   Lab Test  03/14/17   1439  03/02/12  10/20/11   1635   PROTTOTAL   --    --    --   7.0   ALBUMIN  3.5   < >  3.4*  3.9   BILITOTAL   --    --    --   1.0   ALKPHOS   --    --   87  79   AST  18   < >  19  33   ALT  19   < >  31  10    < > = values in this interval not displayed.

## 2017-05-25 DIAGNOSIS — M06.09 RHEUMATOID ARTHRITIS OF MULTIPLE SITES WITHOUT RHEUMATOID FACTOR (H): ICD-10-CM

## 2017-05-25 RX ORDER — AZATHIOPRINE 50 MG/1
100 TABLET ORAL DAILY
Qty: 180 TABLET | Refills: 0 | Status: SHIPPED | OUTPATIENT
Start: 2017-05-25 | End: 2018-03-01

## 2017-05-25 NOTE — TELEPHONE ENCOUNTER
azaTHIOprine (IMURAN) 50 MG tablet   Last Written Prescription Date:  4/18/17  Last Fill Quantity: 120,   # refills: 0  [facility] prescribing provider:  3/14/17  Future Office visit:   9/14/17  Lab Results   Component Value Date    WBC 5.3 03/14/2017     Lab Results   Component Value Date    RBC 4.47 03/14/2017     Lab Results   Component Value Date    HGB 14.0 03/14/2017     Lab Results   Component Value Date    HCT 41.1 03/14/2017     No components found for: MCT  Lab Results   Component Value Date    MCV 92 03/14/2017     Lab Results   Component Value Date    MCH 31.3 03/14/2017     Lab Results   Component Value Date    MCHC 34.1 03/14/2017     Lab Results   Component Value Date    RDW 13.0 03/14/2017     Lab Results   Component Value Date     03/14/2017     Lab Results   Component Value Date    ALT 19 03/14/2017     Lab Results   Component Value Date    ALBUMIN 3.5 03/14/2017         Routing refill request to provider for review/approval because:   azaTHIOprine (IMURAN) 50 MG tablet.

## 2017-06-19 DIAGNOSIS — M06.9 RHEUMATOID ARTHRITIS (H): ICD-10-CM

## 2017-06-19 DIAGNOSIS — M06.09 RHEUMATOID ARTHRITIS OF MULTIPLE SITES WITHOUT RHEUMATOID FACTOR (H): ICD-10-CM

## 2017-06-19 NOTE — LETTER
June 20, 2017      TO: Jodie Dailey  [address] [facility] [place] [address]         Dear MsDanya DoloresYG Dailey,    This letter is a reminder that you are overdue for needed labs. You must have appropriate labs drawn every 8-12 weeks for prescription refills.  LAST LABS 3/14/17    You have been given a 30 day supply/refill of your METHOTREXAT (PF) 50MG/2ML INJ while you get your labs completed.    Regards,  Rheumatology Clinic Staff

## 2017-06-20 RX ORDER — METHOTREXATE 25 MG/ML
18 INJECTION INTRA-ARTERIAL; INTRAMUSCULAR; INTRATHECAL; INTRAVENOUS WEEKLY
Qty: 2.8 ML | Refills: 0 | Status: SHIPPED | OUTPATIENT
Start: 2017-06-20 | End: 2017-11-29

## 2017-06-20 RX ORDER — FOLIC ACID 1 MG/1
1 TABLET ORAL 2 TIMES DAILY
Qty: 180 TABLET | Refills: 3 | Status: SHIPPED | OUTPATIENT
Start: 2017-06-20 | End: 2018-03-07

## 2017-06-20 NOTE — TELEPHONE ENCOUNTER
folic acid 1 MG       Last Written Prescription Date:  7/19/16  Last Fill Quantity: 180,   # refills: 0  Last Office Visit : 3/14/17  Future Office visit:  9/14/17

## 2017-06-20 NOTE — TELEPHONE ENCOUNTER
methotrexate      Last Written Prescription Date:  2/7/17  Last Fill Quantity: 2.8ml,   # refills: 1  Last Office Visit : 3/14/17  Future Office visit:  9/14/17     CBC RESULTS:   Recent Labs   Lab Test  03/14/17   1439   WBC  5.3   RBC  4.47   HGB  14.0   HCT  41.1   MCV  92   MCH  31.3   MCHC  34.1   RDW  13.0   PLT  161       Creatinine   Date Value Ref Range Status   03/14/2017 0.92 0.52 - 1.04 mg/dL Final   ]    Liver Function Studies -   Recent Labs   Lab Test  03/14/17   1439  03/02/12  10/20/11   1635   PROTTOTAL   --    --    --   7.0   ALBUMIN  3.5   < >  3.4*  3.9   BILITOTAL   --    --    --   1.0   ALKPHOS   --    --   87  79   AST  18   < >  19  33   ALT  19   < >  31  10    < > = values in this interval not displayed.       Routing refill request to provider for review/approval because:  LABS DUE  REMINDER LETTER TO PT

## 2017-07-12 ENCOUNTER — MYC MEDICAL ADVICE (OUTPATIENT)
Dept: RHEUMATOLOGY | Facility: CLINIC | Age: 52
End: 2017-07-12

## 2017-07-20 NOTE — TELEPHONE ENCOUNTER
Writer faxed lab orders to desired clinic and left patient a voice mail and [employer] message.   Tisha Bui LPN

## 2017-07-22 ENCOUNTER — HEALTH MAINTENANCE LETTER (OUTPATIENT)
Age: 52
End: 2017-07-22

## 2017-10-05 ENCOUNTER — OFFICE VISIT (OUTPATIENT)
Dept: RHEUMATOLOGY | Facility: CLINIC | Age: 52
End: 2017-10-05
Attending: NURSE PRACTITIONER
Payer: COMMERCIAL

## 2017-10-05 ENCOUNTER — TELEPHONE (OUTPATIENT)
Dept: RHEUMATOLOGY | Facility: CLINIC | Age: 52
End: 2017-10-05

## 2017-10-05 VITALS
SYSTOLIC BLOOD PRESSURE: 146 MMHG | OXYGEN SATURATION: 98 % | BODY MASS INDEX: 34.99 KG/M2 | WEIGHT: 210 LBS | DIASTOLIC BLOOD PRESSURE: 85 MMHG | HEART RATE: 77 BPM | HEIGHT: 65 IN

## 2017-10-05 DIAGNOSIS — Z79.899 ENCOUNTER FOR LONG-TERM CURRENT USE OF MEDICATION: ICD-10-CM

## 2017-10-05 DIAGNOSIS — M05.9 SEROPOSITIVE RHEUMATOID ARTHRITIS (H): Primary | ICD-10-CM

## 2017-10-05 LAB
ALBUMIN SERPL-MCNC: 3.5 G/DL (ref 3.4–5)
ALT SERPL W P-5'-P-CCNC: 23 U/L (ref 0–50)
AST SERPL W P-5'-P-CCNC: 24 U/L (ref 0–45)
BASOPHILS # BLD AUTO: 0.1 10E9/L (ref 0–0.2)
BASOPHILS NFR BLD AUTO: 1 %
CREAT SERPL-MCNC: 0.81 MG/DL (ref 0.52–1.04)
CRP SERPL-MCNC: 3.3 MG/L (ref 0–8)
DIFFERENTIAL METHOD BLD: NORMAL
EOSINOPHIL # BLD AUTO: 0.3 10E9/L (ref 0–0.7)
EOSINOPHIL NFR BLD AUTO: 5.8 %
ERYTHROCYTE [DISTWIDTH] IN BLOOD BY AUTOMATED COUNT: 12.9 % (ref 10–15)
ERYTHROCYTE [SEDIMENTATION RATE] IN BLOOD BY WESTERGREN METHOD: 14 MM/H (ref 0–30)
GFR SERPL CREATININE-BSD FRML MDRD: 75 ML/MIN/1.7M2
HCT VFR BLD AUTO: 40.8 % (ref 35–47)
HGB BLD-MCNC: 13.8 G/DL (ref 11.7–15.7)
IMM GRANULOCYTES # BLD: 0 10E9/L (ref 0–0.4)
IMM GRANULOCYTES NFR BLD: 0.2 %
LYMPHOCYTES # BLD AUTO: 0.9 10E9/L (ref 0.8–5.3)
LYMPHOCYTES NFR BLD AUTO: 17.3 %
MCH RBC QN AUTO: 31.4 PG (ref 26.5–33)
MCHC RBC AUTO-ENTMCNC: 33.8 G/DL (ref 31.5–36.5)
MCV RBC AUTO: 93 FL (ref 78–100)
MONOCYTES # BLD AUTO: 0.4 10E9/L (ref 0–1.3)
MONOCYTES NFR BLD AUTO: 7 %
NEUTROPHILS # BLD AUTO: 3.5 10E9/L (ref 1.6–8.3)
NEUTROPHILS NFR BLD AUTO: 68.7 %
NRBC # BLD AUTO: 0 10*3/UL
NRBC BLD AUTO-RTO: 0 /100
PLATELET # BLD AUTO: 160 10E9/L (ref 150–450)
RBC # BLD AUTO: 4.39 10E12/L (ref 3.8–5.2)
WBC # BLD AUTO: 5.1 10E9/L (ref 4–11)

## 2017-10-05 PROCEDURE — 36415 COLL VENOUS BLD VENIPUNCTURE: CPT | Performed by: NURSE PRACTITIONER

## 2017-10-05 PROCEDURE — 82565 ASSAY OF CREATININE: CPT | Performed by: NURSE PRACTITIONER

## 2017-10-05 PROCEDURE — 84450 TRANSFERASE (AST) (SGOT): CPT | Performed by: NURSE PRACTITIONER

## 2017-10-05 PROCEDURE — 99212 OFFICE O/P EST SF 10 MIN: CPT | Mod: ZF

## 2017-10-05 PROCEDURE — 84460 ALANINE AMINO (ALT) (SGPT): CPT | Performed by: NURSE PRACTITIONER

## 2017-10-05 PROCEDURE — 86140 C-REACTIVE PROTEIN: CPT | Performed by: NURSE PRACTITIONER

## 2017-10-05 PROCEDURE — 85652 RBC SED RATE AUTOMATED: CPT | Performed by: NURSE PRACTITIONER

## 2017-10-05 PROCEDURE — 85025 COMPLETE CBC W/AUTO DIFF WBC: CPT | Performed by: NURSE PRACTITIONER

## 2017-10-05 PROCEDURE — 82040 ASSAY OF SERUM ALBUMIN: CPT | Performed by: NURSE PRACTITIONER

## 2017-10-05 ASSESSMENT — PAIN SCALES - GENERAL: PAINLEVEL: MODERATE PAIN (4)

## 2017-10-05 NOTE — TELEPHONE ENCOUNTER
Left message with patient to return call to clinic. Lab orders are pending per Lj Robledo, appointment can be scheduled.   Tisha Bui LPN

## 2017-10-05 NOTE — MR AVS SNAPSHOT
After Visit Summary   10/5/2017    Jodie Dailey    MRN: [id number]           Patient Information     Date Of Birth          1965        Visit Information        Provider Department      10/5/2017 3:00 PM Lj Robledo APRN [facility]        Today's Diagnoses     Seropositive rheumatoid arthritis (H)    -  1    Encounter for long-term current use of medication          Care Instructions    Component      Latest Ref Rng & Units 3/14/2017 10/5/2017   WBC      4.0 - 11.0 10e9/L 5.3 5.1   RBC Count      3.8 - 5.2 10e12/L 4.47 4.39   Hemoglobin      11.7 - 15.7 g/dL 14.0 13.8   Hematocrit      35.0 - 47.0 % 41.1 40.8   MCV      78 - 100 fl 92 93   MCH      26.5 - 33.0 pg 31.3 31.4   MCHC      31.5 - 36.5 g/dL 34.1 33.8   RDW      10.0 - 15.0 % 13.0 12.9   Platelet Count      150 - 450 10e9/L 161 160   Diff Method       Automated Method Automated Method   % Neutrophils      % 69.9 68.7   % Lymphocytes      % 18.0 17.3   % Monocytes      % 7.3 7.0   % Eosinophils      % 3.9 5.8   % Basophils      % 0.7 1.0   % Immature Granulocytes      % 0.2 0.2   Nucleated RBCs      0 /100 0 0   Absolute Neutrophil      1.6 - 8.3 10e9/L 3.7 3.5   Absolute Lymphocytes      0.8 - 5.3 10e9/L 1.0 0.9   Absolute Monocytes      0.0 - 1.3 10e9/L 0.4 0.4   Absolute Eosinophils      0.0 - 0.7 10e9/L 0.2 0.3   Absolute Basophils      0.0 - 0.2 10e9/L 0.0 0.1   Abs Immature Granulocytes      0 - 0.4 10e9/L 0.0 0.0   Absolute Nucleated RBC       0.0 0.0   Creatinine      0.52 - 1.04 mg/dL 0.92 0.81   GFR Estimate      >60 mL/min/1.7m2 64 75   GFR Estimate If Black      >60 mL/min/1.7m2 77 >90   Albumin      3.4 - 5.0 g/dL 3.5 3.5   ALT      0 - 50 U/L 19 23   AST      0 - 45 U/L 18 24   Sed Rate      0 - 30 mm/h 20    CRP Inflammation      0.0 - 8.0 mg/L 3.8 3.3             Follow-ups after your visit        Follow-up notes from your care team     Return in about 6 months (around 4/5/2018) for Labs every 2  "months.      Your next 10 appointments already scheduled     Apr 05, 2018  3:00 PM CDT   (Arrive by 2:45 PM)   Return Visit with QI Narayanan CNP   [facility] ([facility])    [address] [address] [phone]              Who to contact     If you have questions or need follow up information about today's clinic visit or your schedule please contact [facility] directly at [phone].  Normal or non-critical lab and imaging results will be communicated to you by Piqqualhart, letter or phone within 4 business days after the clinic has received the results. If you do not hear from us within 7 days, please contact the clinic through [employer] or phone. If you have a critical or abnormal lab result, we will notify you by phone as soon as possible.  Submit refill requests through [employer] or call your pharmacy and they will forward the refill request to us. Please allow 3 business days for your refill to be completed.          Additional Information About Your Visit        Pi[employer]qual[employer]ennon Lines Information     [employer] gives you secure access to your electronic health record. If you see a primary care provider, you can also send messages to your care team and make appointments. If you have questions, please call your primary care clinic.  If you do not have a primary care provider, please call [phone] and they will assist you.        Care EveryWhere ID     This is your Care EveryWhere ID. This could be used by other organizations to access your [facility] medical records  [id number]        Your Vitals Were     Pulse Height Pulse Oximetry BMI (Body Mass Index)          77 1.651 m (5' 5\") 98% 34.95 kg/m2         Blood Pressure from Last 3 Encounters:   10/05/17 146/85   03/14/17 145/81   12/08/15 184/86    Weight from Last 3 Encounters:   10/05/17 95.3 kg (210 lb)   03/14/17 94.3 kg (208 lb)   12/08/15 84.4 kg (186 lb)              We " Performed the Following     Albumin level     ALT     AST     CBC with platelets differential     Creatinine     CRP inflammation     Erythrocyte sedimentation rate auto        Primary Care Provider Office Phone # Fax #    Rommel Hector [phone]       [facility] [address] [address]        Equal Access to Services     SERA HENRY : Hadii tony ku hadasho Soomaali, waaxda luqadaha, qaybta kaalmada adeegyada, waxay rafain briseydan lalita rooseevlt jhoan jimenes. So [facility] [phone].    ATENCIÓN: Si habla español, tiene a kate disposición servicios gratuitos de asistencia lingüística. Llame al [phone].    We comply with applicable federal civil rights laws and [place] laws. We do not discriminate on the basis of race, color, national origin, age, disability, sex, sexual orientation, or gender identity.            Thank you!     Thank you for choosing [facility]  for your care. Our goal is always to provide you with excellent care. Hearing back from our patients is one way we can continue to improve our services. Please take a few minutes to complete the written survey that you may receive in the mail after your visit with us. Thank you!             Your Updated Medication List - Protect others around you: Learn how to safely use, store and throw away your medicines at www.disposemymeds.org.          This list is accurate as of: 10/5/17  3:39 PM.  Always use your most recent med list.                   Brand Name Dispense Instructions for use Diagnosis    azaTHIOprine 50 MG tablet    IMURAN    180 tablet    Take 2 tablets (100 mg) by mouth daily    Rheumatoid arthritis of multiple sites without rheumatoid factor (H)       B-6 PO      Take 1 tablet by mouth daily.        CALCIUM 500 + D PO      Take 1 tablet by mouth daily.        folic acid 1 MG tablet    FOLVITE    180 tablet    Take 1 tablet (1 mg) by mouth 2 times daily    Rheumatoid arthritis (H)        "hydrochlorothiazide 25 MG tablet    HYDRODIURIL     Take 1 tablet by mouth daily.        methotrexate sodium (pres-free) 50 MG/2ML Soln injection CHEMO     2.8 mL    Inject 0.72 mLs (18 mg) Subcutaneous once a week    Rheumatoid arthritis of multiple sites without rheumatoid factor (H)       tuberculin-allergy syringes 27G X 1/2\" 1 ML [employer]    B-D TB SYRINGE    100 each    USE AS DIRECTED FOR WEEKLY INJECTIONS OF METHOTREXATE    Medication refill, Rheumatoid arthritis (H), History of basal cell carcinoma       VITAMIN B12 PO      Take 1 tablet by mouth daily.          "

## 2017-10-05 NOTE — TELEPHONE ENCOUNTER
Pt called to request lab orders to be done at [facility] before 10/05 2:45pm appt with Lj Robledo NP. Jodie can be called at [phone].

## 2017-10-05 NOTE — NURSING NOTE
"Chief Complaint   Patient presents with     RECHECK     Follow up for RA       Initial /85  Pulse 77  Ht 1.651 m (5' 5\")  Wt 95.3 kg (210 lb)  SpO2 98%  BMI 34.95 kg/m2 Estimated body mass index is 34.95 kg/(m^2) as calculated from the following:    Height as of this encounter: 1.651 m (5' 5\").    Weight as of this encounter: 95.3 kg (210 lb).  Medication Reconciliation: complete   Rachel Newman CMA    "

## 2017-10-05 NOTE — LETTER
10/5/2017      RE: Jodie Dailey  [address]  [place] [address]       [facility] Clinic Visit        Primary care provider: Rommel Hector    Jodie Dailey  is a 52 year old with sero-positive RA (Neg SUSHIL, no erosions on xray 3/2014, -RF/ low positive 60 CCP in 5/2012). Continues with methotrexate 17.5mg week, azothioprine 100mg day [decreased 10/2011 from 150mg day] folic acid, medrol dose pack May 2012. Previous low albumin 2.2 on 10/2011 normalized repeat with nl work-up.     Copy forward MARCH 14, 2017.  Since we have last seen the patient, it has been about 15 months.  She really feels she has been pretty stable overall.  In general, she has had no joint with more than 15 minutes of morning stiffness.  She did have some pain over the last several months, particularly in her right shoulder with certain extremes of range of motion, but she thinks she is doing pretty well today and cannot necessarily reproduce those.  Her right foot will hurt her at times and the most persistent of her pains is lateral pain over the right hip.  She tends to sleep on that every night, and her description and location of it would be consistent with trochanteric bursitis.       She did also recently get her labs done.  She remains on the methotrexate at 18 mg weekly together with Imuran 100 mg q. day.  On that combination, she has no laboratory abnormalities whatsoever and her labs have been completely stable over the last year.  She also denies any fevers, chills, or signs or symptoms of infection.     October 5, 2017    Continues on MTX 18 mg once week SQ SUN injections, FA 1 mg day and imuran 100 mg once a day. Tolerating. Labs today pending.     No fevers, chills or unintentional weight loss. No dry mouth, dry eyes. No abdominal pain, nausea or vomiting. No diarrhea or constipation. No chest pain. No muscle pain.    May notice more stiffness hips when sits for a long period time, then  "better when walks--more sides of her joints. MOrnings are good no morning stiffness, swelling no loss of ROM or function. Does not feel her joints have changes. NO RA flaring, per loyda. Knees are still \"bone on bone\". No back pain or radiculapathy. At times hard to lay on the sides of her hips. No pain, red or swelling of hands, wrists, shoulders or feet. Once in a while will notice her shoulder, may affect her ability to lift arm but then will be fleeting then goes away not very often, not constant and has FROM. Migratory joint pains \"never the same time\"--goes away within a day and with ibuprofen. Right foot seen podiatry 1st MTP pain with \"bigger\" not able to walk barefoot this was in the past few months went to [facility]-seen about a month ago and will be seeing a different doctor. Cortisone injection of her right knee [facility] a few months ago, got relief and is improved. R>L bone on bone      No LARIOS, SOB, CP, night sweats or fever, chills, cough. Still gets her night sweats which she has this during her menopause and getting hot flashes. No infections.     New basal skin cancer --[facility] will be removing this from right thigh. Will be getting her mammogrm and PAP soon      Prevnar 13 vaccine March 20, 2017-tolerated. Denies fever, chills, cough, SOB, LARIOS or night sweats.        PMH:  Medical-  Past Medical History:   Diagnosis Date     Anemia     History.      Basal cell cancer 3/2014     Benign neoplasm of breast 01/28/1999    L     HTN (hypertension)     On treatment     Left ankle injury 11/2010    Twisted ankle (resolved). MRI 11/2010     Left knee DJD 11/2010    Seen orthopedic [facility] (MRI showing DJD needing TKR)     Leucocytoclastic vasculitis (H) 2009    After Humir, no recurrent lesions of sx. Probable drug induced     Nasal polyps      Obesity, unspecified     minimal     Pleural effusions 1999    At initial dx of RA. Not worse. SOB with stairs. Cardiac work-up " negative. Past told was from scar tissure in lungs 1999.      Pneumonia 1999     Post term pregnancy, delivered with or without mention of antepartum condition     childbirth x 2     Premenopausal patient     Hot flashes.      Pure hypercholesterolemia     elevated 1998     RA (rheumatoid arthritis) (H) 1999    +anti-CCP 60 5/2012; 3/2008 x-rays no erosions hands/feet     Patient Active Problem List    Diagnosis Date Noted     Inflammatory arthritis 03/14/2017     Priority: Medium     History of basal cell carcinoma 12/08/2015     Priority: Medium     Has history of BCC on nose 03/2014, follows with Dermatology        Iron deficiency anemia 03/13/2014     Priority: Medium     Overview:   2/05  Secondary to DUB       Lyme disease 03/13/2014     Priority: Medium     Overview:   Positive - treated -         Restless legs syndrome 03/13/2014     Priority: Medium     Overview:   11/05       Hemorrhoids 03/13/2014     Priority: Medium     Encounter for long-term current use of medication 05/01/2012     Priority: Medium     Problem list name updated by automated process. Provider to review       Chronic sinusitis 10/15/2008     Priority: Medium     Overview:   chronic disease  1/08 Dr. Pierre, sinus CT       Endocrine disorder 03/18/2008     Priority: Medium     Overview:   Replaced at [facility]         Surgical-  Past Surgical History:   Procedure Laterality Date     C STEREOTACTIC BREAST BIOPSY  01/28/1999    L     HC EXC BENIGN SKIN LESION TRUNK/ARM/LEG 0.6-1.0 CM  01/28/1999    R axilla     HC EXCISION BREAST LESION, OPEN >=1  01/28/1999    L     HC PLACE NEEDLE WIRE PREOP, BREAST, INITIAL  01/28/1999    L     RW GENERAL SURG (ABSTRACTED)  1711-4903    right knee surgery ,knee cap     RW OTHER (ABSTRACTED)  2001    sinus surgery,polyps removed       No blood transfusions  Injury-    FH:  No autoimmune disorders, psoriasis, UC, crohn's, SLE, RA, PsA, gout.  No MS or cancer in family  Mother-lymes, macular degeneration    Father-healthy  Brother-celiac  Niece-celiac   PGM/PGF-heart disease     SH:  No Alcohol. NO Smoking. No IVDU. Children. Right handed.        PMSH personally reviewed and updated by me.    ROS:  Negative raynaud s phenomena, hairloss, sun sensitivities, keratoconjunctivitis sicca, pleurisy, inflammatory joint symptoms, significant rashes like malar, oral/nasal or ulcerations, inflammatory eye disease, inflammatory bowel disease, dactylitis, tenosynovitis, or enthespathy. No temporal headache, no jaw claudication, no scalp tenderness, vision changes, carotidynia, cough  +see above   CONSTITUTIONAL: No fevers, night sweats or unintentional weight change. No acute distress, swollen glands  EYES: No vision change, diplopia, pain in eyes or red eyes   EARS, NOSE, MOUTH, THROAT: No tinnitus or hearing change, no epistaxis or nasal discharge, no oral lesions, throat clear. Normal saliva pool.  No drymouth. No thyroid  enlargement  CARDIOVASCULAR: No chest pain, palpitations, or pain with walking, no orthopnea or PND   RESPIRATORY: No dyspnea, cough, shortness of breath or wheezing. No pleurisy.   GI: No nausea, vomiting, diarrhea or constipation, no abdominal pain, or blood in stools.   : No change in urine, no dysuria or hematuria   MUSCKL: No swollen, tender, red or painful joints. No nodules. No enthesitis, plantar fascitis or heel pain.   INTEGUMENTARY: No concerning lesions or moles   NEURO: No loss of strength or sensation, no numbness or tingling, no tremor, no dizziness, no headache. No falls   ENDO: No polyuria or polydipsia, no temperature intolerance   HEME/LYMPH:No concerning bumps, bleeding problems, or swollen lymph nodes. No recent infections, hospitalizations or new illnesses.   ALLERGY: No environmental allergies   PSYCH:No depression or anxiety, no sleep problems.  Otherwise 14 point ROS obtained, reviewed and found negative.     Physical exam:  Vitals: Blood pressure 146/85, pulse 77, height  "1.651 m (5' 5\"), weight 95.3 kg (210 lb), SpO2 98 %.  Wt Readings from Last 4 Encounters:   10/05/17 95.3 kg (210 lb)   03/14/17 94.3 kg (208 lb)   12/08/15 84.4 kg (186 lb)   08/18/15 81.6 kg (180 lb)     Constitutional: WD-WN-WG cooperative  Eyes: nl EOM, PERRLA, vision, conjunctiva, sclera  ENT: nl external ears, nose, hearing, lips, teeth, gums, throat  Neck: no mass or thyroid enlargement  Resp: lungs clear to auscultation, nl to palpation, nl effort  CV: RRR, no murmurs, rubs or gallops, no edema  GI: no ABD mass or tenderness, no HSM  : not tested  Lymph: no cervical or epitrochlear nodes  MS: halgux valgus  Knees, tender ITB bands and trochanteric bursa, right 1st MTP bunion, prominent but not swollen or red. All TMJ, neck, shoulder, elbow, wrist, hand, spine, hip, knee, ankle, and foot joints were examined and otherwise found normal. Normal  strength. No active synovitis or deformity. Full ROM. Normal prayer sign. Negative Negative Lhermitte's sign. No periuncle erythema  Skin: no nail pitting, alopecia, rash  Neuro: nl cranial nerves, strength, sensation, DTRs.   Psych: nl judgement, orientation, memory, affect.      Labs/Imaging:  Results for orders placed or performed in visit on 10/05/17   Albumin level   Result Value Ref Range    Albumin 3.5 3.4 - 5.0 g/dL   ALT   Result Value Ref Range    ALT 23 0 - 50 U/L   AST   Result Value Ref Range    AST 24 0 - 45 U/L   CBC with platelets differential   Result Value Ref Range    WBC 5.1 4.0 - 11.0 10e9/L    RBC Count 4.39 3.8 - 5.2 10e12/L    Hemoglobin 13.8 11.7 - 15.7 g/dL    Hematocrit 40.8 35.0 - 47.0 %    MCV 93 78 - 100 fl    MCH 31.4 26.5 - 33.0 pg    MCHC 33.8 31.5 - 36.5 g/dL    RDW 12.9 10.0 - 15.0 %    Platelet Count 160 150 - 450 10e9/L    Diff Method Automated Method     % Neutrophils 68.7 %    % Lymphocytes 17.3 %    % Monocytes 7.0 %    % Eosinophils 5.8 %    % Basophils 1.0 %    % Immature Granulocytes 0.2 %    Nucleated RBCs 0 0 /100    " Absolute Neutrophil 3.5 1.6 - 8.3 10e9/L    Absolute Lymphocytes 0.9 0.8 - 5.3 10e9/L    Absolute Monocytes 0.4 0.0 - 1.3 10e9/L    Absolute Eosinophils 0.3 0.0 - 0.7 10e9/L    Absolute Basophils 0.1 0.0 - 0.2 10e9/L    Abs Immature Granulocytes 0.0 0 - 0.4 10e9/L    Absolute Nucleated RBC 0.0    Creatinine   Result Value Ref Range    Creatinine 0.81 0.52 - 1.04 mg/dL    GFR Estimate 75 >60 mL/min/1.7m2    GFR Estimate If Black >90 >60 mL/min/1.7m2   Erythrocyte sedimentation rate auto   Result Value Ref Range    Sed Rate 14 0 - 30 mm/h   CRP inflammation   Result Value Ref Range    CRP Inflammation 3.3 0.0 - 8.0 mg/L       Impression/Plan:    1. Sero-positive RA (-RF, -SUSHIL +CCP low positive 60 in 5/2012 outside lab, xrays 3/2014 No erosions) . Labs Nl. RA =remission. No infections and tolerating medications well.  Based on this, continue current plan  --Methotrexate 18 mg SQ q7days, aziothioprine 100mg day, folic acid.   --DMARD labs every 8-12 weeks. No alcohol. Hold if any infections, fever, chills or cough  --yearly derm visit hx basal cell skin cancer     2. OA of the knees (needs TKR L>R), I feel this is contributing to her ITB syndrome and trochanteric bursitis. Undercare [facility]  3. Right 1st MTP pain, seeing [facility] and will send records to us as recently got x-rays.   4. History of Pleural Effusions: Stable no symptoms now. .   5. Hx of Basal Cell Carcinoma -recent recurrence. YUE for derm and get yearly checks      The patient understood the rational for the diagnosis and treatment plan. Patient shared in the decision making. All questions were answered to best of my ability and the patient's satisfaction  Lj BUSBY, BRIGITTE, MSN  [facility]  Department of Rheumatology & Autoimmune Disorders          All labs reviewed and discussed with patient at office visit. Instructed to call for any further questions.       Lj BUSBY CNP, MSN  10/5/2017 3:31  PM

## 2017-10-05 NOTE — PROGRESS NOTES
All labs reviewed and discussed with patient at office visit. Instructed to call for any further questions.       Lj BUSBY CNP, MSN  10/5/2017  3:31 PM

## 2017-10-05 NOTE — TELEPHONE ENCOUNTER
Tech from [facility] called to request labs to be signed by hannah Robledo. Pt is in clinic for labs.

## 2017-10-05 NOTE — PROGRESS NOTES
[facility] - Rheumatology Clinic Visit        Primary care provider: Rommel Hector    Jodie Dailey  is a 52 year old with sero-positive RA (Neg SUSHIL, no erosions on xray 3/2014, -RF/ low positive 60 CCP in 5/2012). Continues with methotrexate 17.5mg week, azothioprine 100mg day [decreased 10/2011 from 150mg day] folic acid, medrol dose pack May 2012. Previous low albumin 2.2 on 10/2011 normalized repeat with nl work-up.     Copy forward MARCH 14, 2017.  Since we have last seen the patient, it has been about 15 months.  She really feels she has been pretty stable overall.  In general, she has had no joint with more than 15 minutes of morning stiffness.  She did have some pain over the last several months, particularly in her right shoulder with certain extremes of range of motion, but she thinks she is doing pretty well today and cannot necessarily reproduce those.  Her right foot will hurt her at times and the most persistent of her pains is lateral pain over the right hip.  She tends to sleep on that every night, and her description and location of it would be consistent with trochanteric bursitis.       She did also recently get her labs done.  She remains on the methotrexate at 18 mg weekly together with Imuran 100 mg q. day.  On that combination, she has no laboratory abnormalities whatsoever and her labs have been completely stable over the last year.  She also denies any fevers, chills, or signs or symptoms of infection.     October 5, 2017    Continues on MTX 18 mg once week SQ SUN injections, FA 1 mg day and imuran 100 mg once a day. Tolerating. Labs today pending.     No fevers, chills or unintentional weight loss. No dry mouth, dry eyes. No abdominal pain, nausea or vomiting. No diarrhea or constipation. No chest pain. No muscle pain.    May notice more stiffness hips when sits for a long period time, then better when walks--more sides of her joints. MOrnings are good no morning  "stiffness, swelling no loss of ROM or function. Does not feel her joints have changes. NO RA flaring, per loyda. Knees are still \"bone on bone\". No back pain or radiculapathy. At times hard to lay on the sides of her hips. No pain, red or swelling of hands, wrists, shoulders or feet. Once in a while will notice her shoulder, may affect her ability to lift arm but then will be fleeting then goes away not very often, not constant and has FROM. Migratory joint pains \"never the same time\"--goes away within a day and with ibuprofen. Right foot seen podiatry 1st MTP pain with \"bigger\" not able to walk barefoot this was in the past few months went to [facility]-seen about a month ago and will be seeing a different doctor. Cortisone injection of her right knee [facility] a few months ago, got relief and is improved. R>L bone on bone      No LARIOS, SOB, CP, night sweats or fever, chills, cough. Still gets her night sweats which she has this during her menopause and getting hot flashes. No infections.     New basal skin cancer --[facility] will be removing this from right thigh. Will be getting her mammogrm and PAP soon      Prevnar 13 vaccine March 20, 2017-tolerated. Denies fever, chills, cough, SOB, LARIOS or night sweats.        PMH:  Medical-  Past Medical History:   Diagnosis Date     Anemia     History.      Basal cell cancer 3/2014     Benign neoplasm of breast 01/28/1999    L     HTN (hypertension)     On treatment     Left ankle injury 11/2010    Twisted ankle (resolved). MRI 11/2010     Left knee DJD 11/2010    Seen orthopedic [facility] (MRI showing DJD needing TKR)     Leucocytoclastic vasculitis (H) 2009    After Humir, no recurrent lesions of sx. Probable drug induced     Nasal polyps      Obesity, unspecified     minimal     Pleural effusions 1999    At initial dx of RA. Not worse. SOB with stairs. Cardiac work-up negative. Past told was from scar tissure in lungs 1999.      Pneumonia " 1999     Post term pregnancy, delivered with or without mention of antepartum condition     childbirth x 2     Premenopausal patient     Hot flashes.      Pure hypercholesterolemia     elevated 1998     RA (rheumatoid arthritis) (H) 1999    +anti-CCP 60 5/2012; 3/2008 x-rays no erosions hands/feet     Patient Active Problem List    Diagnosis Date Noted     Inflammatory arthritis 03/14/2017     Priority: Medium     History of basal cell carcinoma 12/08/2015     Priority: Medium     Has history of BCC on nose 03/2014, follows with Dermatology        Iron deficiency anemia 03/13/2014     Priority: Medium     Overview:   2/05  Secondary to DUB       Lyme disease 03/13/2014     Priority: Medium     Overview:   Positive - treated -         Restless legs syndrome 03/13/2014     Priority: Medium     Overview:   11/05       Hemorrhoids 03/13/2014     Priority: Medium     Encounter for long-term current use of medication 05/01/2012     Priority: Medium     Problem list name updated by automated process. Provider to review       Chronic sinusitis 10/15/2008     Priority: Medium     Overview:   chronic disease  1/08 Dr. Pierre, sinus CT       Endocrine disorder 03/18/2008     Priority: Medium     Overview:   Replaced at [facility]         Surgical-  Past Surgical History:   Procedure Laterality Date     C STEREOTACTIC BREAST BIOPSY  01/28/1999    L     HC EXC BENIGN SKIN LESION TRUNK/ARM/LEG 0.6-1.0 CM  01/28/1999    R axilla     HC EXCISION BREAST LESION, OPEN >=1  01/28/1999    L     HC PLACE NEEDLE WIRE PREOP, BREAST, INITIAL  01/28/1999    L     RW GENERAL SURG (ABSTRACTED)  3320-4124    right knee surgery ,knee cap     RW OTHER (ABSTRACTED)  2001    sinus surgery,polyps removed       No blood transfusions  Injury-    FH:  No autoimmune disorders, psoriasis, UC, crohn's, SLE, RA, PsA, gout.  No MS or cancer in family  Mother-lymes, macular degeneration   Father-healthy  Brother-celiac  Niece-celiac   PGM/PGF-heart disease  "    SH:  No Alcohol. NO Smoking. No IVDU. Children. Right handed.        [facility] personally reviewed and updated by me.    ROS:  Negative raynaud s phenomena, hairloss, sun sensitivities, keratoconjunctivitis sicca, pleurisy, inflammatory joint symptoms, significant rashes like malar, oral/nasal or ulcerations, inflammatory eye disease, inflammatory bowel disease, dactylitis, tenosynovitis, or enthespathy. No temporal headache, no jaw claudication, no scalp tenderness, vision changes, carotidynia, cough  +see above   CONSTITUTIONAL: No fevers, night sweats or unintentional weight change. No acute distress, swollen glands  EYES: No vision change, diplopia, pain in eyes or red eyes   EARS, NOSE, MOUTH, THROAT: No tinnitus or hearing change, no epistaxis or nasal discharge, no oral lesions, throat clear. Normal saliva pool.  No drymouth. No thyroid  enlargement  CARDIOVASCULAR: No chest pain, palpitations, or pain with walking, no orthopnea or PND   RESPIRATORY: No dyspnea, cough, shortness of breath or wheezing. No pleurisy.   GI: No nausea, vomiting, diarrhea or constipation, no abdominal pain, or blood in stools.   : No change in urine, no dysuria or hematuria   MUSCKL: No swollen, tender, red or painful joints. No nodules. No enthesitis, plantar fascitis or heel pain.   INTEGUMENTARY: No concerning lesions or moles   NEURO: No loss of strength or sensation, no numbness or tingling, no tremor, no dizziness, no headache. No falls   ENDO: No polyuria or polydipsia, no temperature intolerance   HEME/LYMPH:No concerning bumps, bleeding problems, or swollen lymph nodes. No recent infections, hospitalizations or new illnesses.   ALLERGY: No environmental allergies   PSYCH:No depression or anxiety, no sleep problems.  Otherwise 14 point ROS obtained, reviewed and found negative.     Physical exam:  Vitals: Blood pressure 146/85, pulse 77, height 1.651 m (5' 5\"), weight 95.3 kg (210 lb), SpO2 98 %.  Wt Readings from " Last 4 Encounters:   10/05/17 95.3 kg (210 lb)   03/14/17 94.3 kg (208 lb)   12/08/15 84.4 kg (186 lb)   08/18/15 81.6 kg (180 lb)     Constitutional: WD-WN-WG cooperative  Eyes: nl EOM, PERRLA, vision, conjunctiva, sclera  ENT: nl external ears, nose, hearing, lips, teeth, gums, throat  Neck: no mass or thyroid enlargement  Resp: lungs clear to auscultation, nl to palpation, nl effort  CV: RRR, no murmurs, rubs or gallops, no edema  GI: no ABD mass or tenderness, no HSM  : not tested  Lymph: no cervical or epitrochlear nodes  MS: halgux valgus  Knees, tender ITB bands and trochanteric bursa, right 1st MTP bunion, prominent but not swollen or red. All TMJ, neck, shoulder, elbow, wrist, hand, spine, hip, knee, ankle, and foot joints were examined and otherwise found normal. Normal  strength. No active synovitis or deformity. Full ROM. Normal prayer sign. Negative Negative Lhermitte's sign. No periuncle erythema  Skin: no nail pitting, alopecia, rash  Neuro: nl cranial nerves, strength, sensation, DTRs.   Psych: nl judgement, orientation, memory, affect.      Labs/Imaging:  Results for orders placed or performed in visit on 10/05/17   Albumin level   Result Value Ref Range    Albumin 3.5 3.4 - 5.0 g/dL   ALT   Result Value Ref Range    ALT 23 0 - 50 U/L   AST   Result Value Ref Range    AST 24 0 - 45 U/L   CBC with platelets differential   Result Value Ref Range    WBC 5.1 4.0 - 11.0 10e9/L    RBC Count 4.39 3.8 - 5.2 10e12/L    Hemoglobin 13.8 11.7 - 15.7 g/dL    Hematocrit 40.8 35.0 - 47.0 %    MCV 93 78 - 100 fl    MCH 31.4 26.5 - 33.0 pg    MCHC 33.8 31.5 - 36.5 g/dL    RDW 12.9 10.0 - 15.0 %    Platelet Count 160 150 - 450 10e9/L    Diff Method Automated Method     % Neutrophils 68.7 %    % Lymphocytes 17.3 %    % Monocytes 7.0 %    % Eosinophils 5.8 %    % Basophils 1.0 %    % Immature Granulocytes 0.2 %    Nucleated RBCs 0 0 /100    Absolute Neutrophil 3.5 1.6 - 8.3 10e9/L    Absolute Lymphocytes 0.9 0.8  - 5.3 10e9/L    Absolute Monocytes 0.4 0.0 - 1.3 10e9/L    Absolute Eosinophils 0.3 0.0 - 0.7 10e9/L    Absolute Basophils 0.1 0.0 - 0.2 10e9/L    Abs Immature Granulocytes 0.0 0 - 0.4 10e9/L    Absolute Nucleated RBC 0.0    Creatinine   Result Value Ref Range    Creatinine 0.81 0.52 - 1.04 mg/dL    GFR Estimate 75 >60 mL/min/1.7m2    GFR Estimate If Black >90 >60 mL/min/1.7m2   Erythrocyte sedimentation rate auto   Result Value Ref Range    Sed Rate 14 0 - 30 mm/h   CRP inflammation   Result Value Ref Range    CRP Inflammation 3.3 0.0 - 8.0 mg/L       Impression/Plan:    1. Sero-positive RA (-RF, -SUSHIL +CCP low positive 60 in 5/2012 outside lab, xrays 3/2014 No erosions) . Labs Nl. RA =remission. No infections and tolerating medications well.  Based on this, continue current plan  --Methotrexate 18 mg SQ q7days, aziothioprine 100mg day, folic acid.   --DMARD labs every 8-12 weeks. No alcohol. Hold if any infections, fever, chills or cough  --yearly derm visit hx basal cell skin cancer     2. OA of the knees (needs TKR L>R), I feel this is contributing to her ITB syndrome and trochanteric bursitis. Undercare [facility]  3. Right 1st MTP pain, seeing [facility] and will send records to us as recently got x-rays.   4. History of Pleural Effusions: Stable no symptoms now. .   5. Hx of Basal Cell Carcinoma -recent recurrence. [facility]UE for derm and get yearly checks      The patient understood the rational for the diagnosis and treatment plan. Patient shared in the decision making. All questions were answered to best of my ability and the patient's satisfaction  Lj Robledo APRN, CNP, MSN  [facility]  Department of Rheumatology & Autoimmune Disorders

## 2017-10-05 NOTE — TELEPHONE ENCOUNTER
Pt calling back to speak with WARREN Giles. Consulted with WARREN Giles and referred Pt [place] [facility] [place]. Pt agreed.

## 2017-10-05 NOTE — PATIENT INSTRUCTIONS
Component      Latest Ref Rng & Units 3/14/2017 10/5/2017   WBC      4.0 - 11.0 10e9/L 5.3 5.1   RBC Count      3.8 - 5.2 10e12/L 4.47 4.39   Hemoglobin      11.7 - 15.7 g/dL 14.0 13.8   Hematocrit      35.0 - 47.0 % 41.1 40.8   MCV      78 - 100 fl 92 93   MCH      26.5 - 33.0 pg 31.3 31.4   MCHC      31.5 - 36.5 g/dL 34.1 33.8   RDW      10.0 - 15.0 % 13.0 12.9   Platelet Count      150 - 450 10e9/L 161 160   Diff Method       Automated Method Automated Method   % Neutrophils      % 69.9 68.7   % Lymphocytes      % 18.0 17.3   % Monocytes      % 7.3 7.0   % Eosinophils      % 3.9 5.8   % Basophils      % 0.7 1.0   % Immature Granulocytes      % 0.2 0.2   Nucleated RBCs      0 /100 0 0   Absolute Neutrophil      1.6 - 8.3 10e9/L 3.7 3.5   Absolute Lymphocytes      0.8 - 5.3 10e9/L 1.0 0.9   Absolute Monocytes      0.0 - 1.3 10e9/L 0.4 0.4   Absolute Eosinophils      0.0 - 0.7 10e9/L 0.2 0.3   Absolute Basophils      0.0 - 0.2 10e9/L 0.0 0.1   Abs Immature Granulocytes      0 - 0.4 10e9/L 0.0 0.0   Absolute Nucleated RBC       0.0 0.0   Creatinine      0.52 - 1.04 mg/dL 0.92 0.81   GFR Estimate      >60 mL/min/1.7m2 64 75   GFR Estimate If Black      >60 mL/min/1.7m2 77 >90   Albumin      3.4 - 5.0 g/dL 3.5 3.5   ALT      0 - 50 U/L 19 23   AST      0 - 45 U/L 18 24   Sed Rate      0 - 30 mm/h 20    CRP Inflammation      0.0 - 8.0 mg/L 3.8 3.3

## 2017-11-29 DIAGNOSIS — M06.09 RHEUMATOID ARTHRITIS OF MULTIPLE SITES WITHOUT RHEUMATOID FACTOR (H): ICD-10-CM

## 2017-12-04 RX ORDER — METHOTREXATE 25 MG/ML
18 INJECTION INTRA-ARTERIAL; INTRAMUSCULAR; INTRATHECAL; INTRAVENOUS WEEKLY
Qty: 2.8 ML | Refills: 0 | Status: SHIPPED | OUTPATIENT
Start: 2017-12-04 | End: 2018-04-05

## 2017-12-04 NOTE — TELEPHONE ENCOUNTER
methotrexate sodium     Last Written Prescription Date:  6/20/17  Last Fill Quantity: 2.8ML,   # refills: 0  Last Office Visit:10/5/17  Future Office visit:  4/5/18    CBC RESULTS:   Recent Labs   Lab Test  10/05/17   1434   WBC  5.1   RBC  4.39   HGB  13.8   HCT  40.8   MCV  93   MCH  31.4   MCHC  33.8   RDW  12.9   PLT  160       Creatinine   Date Value Ref Range Status   10/05/2017 0.81 0.52 - 1.04 mg/dL Final   ]    Liver Function Studies -   Recent Labs   Lab Test  10/05/17   1434  03/02/12  10/20/11   1635   PROTTOTAL   --    --    --   7.0   ALBUMIN  3.5   < >  3.4*  3.9   BILITOTAL   --    --    --   1.0   ALKPHOS   --    --   87  79   AST  24   < >  19  33   ALT  23   < >  31  10    < > = values in this interval not displayed.

## 2018-03-01 DIAGNOSIS — M06.09 RHEUMATOID ARTHRITIS OF MULTIPLE SITES WITHOUT RHEUMATOID FACTOR (H): ICD-10-CM

## 2018-03-03 NOTE — TELEPHONE ENCOUNTER
azaTHIOprine (IMURAN) 50 MG tablet    Last Written Prescription Date:  5/25/17  Last Fill Quantity: 180,   # refills: 0  Last Office Visit: 10/5/17  Future Office visit:  4/5/18    CBC RESULTS:   Recent Labs   Lab Test  10/05/17   1434   WBC  5.1   RBC  4.39   HGB  13.8   HCT  40.8   MCV  93   MCH  31.4   MCHC  33.8   RDW  12.9   PLT  160       Creatinine   Date Value Ref Range Status   10/05/2017 0.81 0.52 - 1.04 mg/dL Final   ]    Liver Function Studies -   Recent Labs   Lab Test  10/05/17   1434  03/02/12  10/20/11   1635   PROTTOTAL   --    --    --   7.0   ALBUMIN  3.5   < >  3.4*  3.9   BILITOTAL   --    --    --   1.0   ALKPHOS   --    --   87  79   AST  24   < >  19  33   ALT  23   < >  31  10    < > = values in this interval not displayed.     Lab Results   Component Value Date    ALBUMIN 3.5 10/05/2017       Routing refill request to provider for review/approval because:  Labs past due

## 2018-03-05 RX ORDER — AZATHIOPRINE 50 MG/1
100 TABLET ORAL DAILY
Qty: 180 TABLET | Refills: 0 | Status: SHIPPED | OUTPATIENT
Start: 2018-03-05 | End: 2018-06-22

## 2018-03-07 DIAGNOSIS — Z79.899 HIGH RISK MEDICATION USE: Primary | ICD-10-CM

## 2018-03-07 DIAGNOSIS — M19.90 INFLAMMATORY ARTHRITIS: ICD-10-CM

## 2018-03-07 DIAGNOSIS — M06.9 RHEUMATOID ARTHRITIS (H): ICD-10-CM

## 2018-03-07 RX ORDER — FOLIC ACID 1 MG/1
TABLET ORAL
Qty: 180 TABLET | Refills: 1 | Status: SHIPPED | OUTPATIENT
Start: 2018-03-07 | End: 2018-10-23

## 2018-04-03 LAB
ALBUMIN SERPL-MCNC: 4 G/DL (ref 3.5–5)
ALT SERPL-CCNC: 18 U/L (ref 8–45)
AST SERPL-CCNC: 27 U/L (ref 2–40)
BASOPHILS NFR BLD AUTO: 0.6 %
CREAT SERPL-MCNC: 0.76 MG/DL (ref 0.57–1.11)
CRP SERPL-MCNC: 0.3 MG/DL (ref 0–0.8)
EOSINOPHIL NFR BLD AUTO: 4.8 %
ERYTHROCYTE [DISTWIDTH] IN BLOOD BY AUTOMATED COUNT: 13.1 % (ref 11.5–15.5)
ERYTHROCYTE [SEDIMENTATION RATE] IN BLOOD: 23 MM/HR
GFR SERPL CREATININE-BSD FRML MDRD: >60 ML/MIN/1.73M2
HCT VFR BLD AUTO: 40.2 % (ref 33–51)
HEMOGLOBIN: 13.4 G/DL (ref 12–16)
LYMPHOCYTES NFR BLD AUTO: 17.3 %
MCH RBC QN AUTO: 30.5 PG (ref 26–34)
MCHC RBC AUTO-ENTMCNC: 33.3 G/DL (ref 32–36)
MCV RBC AUTO: 91 FL (ref 80–100)
MONOCYTES NFR BLD AUTO: 8.4 %
NEUTROPHILS NFR BLD AUTO: 68.9 %
PLATELET COUNT - QUEST: 175 10^9/L (ref 150–450)
RBC # BLD AUTO: 4.4 10^12/L (ref 4–5.2)
WBC # BLD AUTO: 4.8 10^9/L (ref 4.5–11)

## 2018-04-04 NOTE — PROGRESS NOTES
The blood counts, liver, kidney and CRP inflammation labs are normal.     Lj BUSBY, CNP, MSN  4/4/2018  7:47 AM

## 2018-04-05 ENCOUNTER — OFFICE VISIT (OUTPATIENT)
Dept: RHEUMATOLOGY | Facility: CLINIC | Age: 53
End: 2018-04-05
Attending: NURSE PRACTITIONER
Payer: COMMERCIAL

## 2018-04-05 VITALS
HEIGHT: 65 IN | TEMPERATURE: 98.4 F | WEIGHT: 211.6 LBS | OXYGEN SATURATION: 97 % | BODY MASS INDEX: 35.25 KG/M2 | DIASTOLIC BLOOD PRESSURE: 78 MMHG | HEART RATE: 71 BPM | SYSTOLIC BLOOD PRESSURE: 120 MMHG

## 2018-04-05 DIAGNOSIS — M06.09 RHEUMATOID ARTHRITIS OF MULTIPLE SITES WITHOUT RHEUMATOID FACTOR (H): ICD-10-CM

## 2018-04-05 DIAGNOSIS — Z79.899 ENCOUNTER FOR LONG-TERM CURRENT USE OF MEDICATION: ICD-10-CM

## 2018-04-05 DIAGNOSIS — R07.89 ATYPICAL CHEST PAIN: ICD-10-CM

## 2018-04-05 DIAGNOSIS — Z85.828 HISTORY OF BASAL CELL CARCINOMA: ICD-10-CM

## 2018-04-05 DIAGNOSIS — R76.8 POSITIVE ANTI-CCP TEST: ICD-10-CM

## 2018-04-05 DIAGNOSIS — M05.79 RHEUMATOID ARTHRITIS INVOLVING MULTIPLE SITES WITH POSITIVE RHEUMATOID FACTOR (H): Primary | ICD-10-CM

## 2018-04-05 PROCEDURE — G0463 HOSPITAL OUTPT CLINIC VISIT: HCPCS | Mod: ZF

## 2018-04-05 RX ORDER — METHOTREXATE 25 MG/ML
INJECTION INTRA-ARTERIAL; INTRAMUSCULAR; INTRATHECAL; INTRAVENOUS
Qty: 24 ML | Refills: 0 | Status: SHIPPED | OUTPATIENT
Start: 2018-04-05 | End: 2019-01-02

## 2018-04-05 RX ORDER — FOLIC ACID 1 MG/1
TABLET ORAL
COMMUNITY
Start: 2008-04-16 | End: 2018-04-05

## 2018-04-05 RX ORDER — AZATHIOPRINE 50 MG/1
TABLET ORAL
COMMUNITY
Start: 2012-05-23 | End: 2018-04-18

## 2018-04-05 RX ORDER — HYDROCHLOROTHIAZIDE 25 MG/1
25 TABLET ORAL
COMMUNITY
Start: 2018-02-27 | End: 2018-04-18

## 2018-04-05 RX ORDER — VALACYCLOVIR HYDROCHLORIDE 1 G/1
1 TABLET, FILM COATED ORAL
COMMUNITY
Start: 2014-07-14

## 2018-04-05 ASSESSMENT — PAIN SCALES - GENERAL: PAINLEVEL: NO PAIN (0)

## 2018-04-05 NOTE — LETTER
4/5/2018      RE: Jodie Dailey  [address]  [place] [address]       [facility] Health - Rheumatology Clinic Visit        Primary care provider: Rommel Hector    Jodie Dailey  is a 52 year old with sero-positive RA (Neg SUSHIL, no erosions on xray 3/2014, -RF/ low positive 60 CCP in 5/2012). Continues with methotrexate 17.5mg week, azothioprine 100mg day [decreased 10/2011 from 150mg day] folic acid, medrol dose pack May 2012.     Copy forward MARCH 14, 2017.  Since we have last seen the patient, it has been about 15 months.  She really feels she has been pretty stable overall.  In general, she has had no joint with more than 15 minutes of morning stiffness.  She did have some pain over the last several months, particularly in her right shoulder with certain extremes of range of motion, but she thinks she is doing pretty well today and cannot necessarily reproduce those.  Her right foot will hurt her at times and the most persistent of her pains is lateral pain over the right hip.  She tends to sleep on that every night, and her description and location of it would be consistent with trochanteric bursitis.       She did also recently get her labs done.  She remains on the methotrexate at 18 mg weekly together with Imuran 100 mg q. day.  On that combination, she has no laboratory abnormalities whatsoever and her labs have been completely stable over the last year.  She also denies any fevers, chills, or signs or symptoms of infection.     April 5, 2018  Reports taking MTX 0.7 ML or 17.5 mg once week SQ SUN injections, FA 1 mg day and imuran 100 mg once a day. Tolerating. Labs today Nl  No infections or changes to her health. She did have the basal skin cancer taken off her thigh and face since I seen her last, and continues the annual dermatology checks at [facility]. Will be getting mammogram and PAP later this month. No recent cortisone at [facility], does have bone on bone  in her knees but does not wish for surgery. In menopause getting hot flashes. Migratory RA flares left hand, then right foot then now right wrist/2nd finger lasting a few days had swelling with this. No neck pains. EAS <30 min with waking with hands achey. No LARIOS but noticing midsternal CP at rest the past few month, not with exertion nor any nausea, sweating or other symptoms. No breathing issues. Will get stiff with gelling in her joints wanda hips and knees. Due for her annual physical and pneumonia 23 vaccine (she will do with PCP).     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 no recurrent lesions felt d/t drug, pneumonia, high cholesterol, lymes, chronic sinus, ZANDER     Surgical-  Past Surgical History:   Procedure Laterality Date     C STEREOTACTIC BREAST BIOPSY  01/28/1999    L     HC EXC BENIGN SKIN LESION TRUNK/ARM/LEG 0.6-1.0 CM  01/28/1999    R axilla     HC EXCISION BREAST LESION, OPEN >=1  01/28/1999    L     HC PLACE NEEDLE WIRE PREOP, BREAST, INITIAL  01/28/1999    L     RW GENERAL SURG (ABSTRACTED)  [id number]    right knee surgery ,knee cap     RW OTHER (ABSTRACTED)  2001    sinus surgery,polyps removed       FH:  No autoimmune disorders, psoriasis, UC, crohn's, SLE, RA, PsA, gout.  No MS or cancer in family  Mother-lymes, macular degeneration   Father-healthy  Brother-celiac  Niece-celiac   PGM/PGF-heart disease  MGM-cancer tumor lung      SH:  No Alcohol. NO Smoking. No IVDU. Children. Right handed.        PMSH personally reviewed and updated by me.    ROS:  Negative raynaud s phenomena, hairloss, sun sensitivities, keratoconjunctivitis sicca, pleurisy, inflammatory joint symptoms, significant rashes like malar, oral/nasal or  "ulcerations, inflammatory eye disease, inflammatory bowel disease, dactylitis, tenosynovitis, or enthespathy. No temporal headache, no jaw claudication, no scalp tenderness, vision changes, carotidynia, cough  +see above   CONSTITUTIONAL: No fevers, night sweats or unintentional weight change. No acute distress, swollen glands  EYES: No vision change, diplopia, pain in eyes or red eyes   EARS, NOSE, MOUTH, THROAT: No tinnitus or hearing change, no epistaxis or nasal discharge, no oral lesions, throat clear. Normal saliva pool.  No drymouth. No thyroid  enlargement  CARDIOVASCULAR: No palpitations, or pain with walking, no orthopnea or PND + see above   RESPIRATORY: No dyspnea, cough, shortness of breath or wheezing. No pleurisy.   GI: No nausea, vomiting, diarrhea or constipation, no abdominal pain, or blood in stools.   : No change in urine, no dysuria or hematuria   MUSCKL: see above    INTEGUMENTARY: No concerning lesions or moles   NEURO: No loss of strength or sensation, no numbness or tingling, no tremor, no dizziness, no headache. No falls   ENDO: No polyuria or polydipsia, no temperature intolerance   HEME/LYMPH:No concerning bumps, bleeding problems, or swollen lymph nodes. No recent infections, hospitalizations or new illnesses.   ALLERGY: No environmental allergies   PSYCH:No depression or anxiety, no sleep problems.  Otherwise 14 point ROS obtained, reviewed and found negative.     Physical exam:  Vitals: Blood pressure 120/78, pulse 71, temperature 98.4  F (36.9  C), temperature source Oral, height 1.651 m (5' 5\"), weight 96 kg (211 lb 9.6 oz), last menstrual period 03/02/2002, SpO2 97 %.  Wt Readings from Last 4 Encounters:   04/05/18 96 kg (211 lb 9.6 oz)   10/05/17 95.3 kg (210 lb)   03/14/17 94.3 kg (208 lb)   12/08/15 84.4 kg (186 lb)     Constitutional: WD-WN-WG cooperative  Eyes: nl EOM, PERRLA, vision, conjunctiva, sclera  ENT: nl external ears, nose, hearing, lips, teeth, gums, throat  Neck: " no mass or thyroid enlargement  Resp: lungs clear to auscultation, nl to palpation, nl effort  CV: RRR, no murmurs, rubs or gallops, no edema  GI: no ABD mass or tenderness, no HSM  : not tested  Lymph: no cervical or epitrochlear nodes  MS: +s/t right wrist, right 2nd finger, 2-3 MCP. halgux valgus  Knees +creptitus with halgus valgus, right 1st MTP bunion, prominent but not swollen or red. All other TMJ, neck, shoulder, elbow, wrist, hand, spine, hip, knee, ankle, and foot joints were examined and otherwise found normal. Normal  strength. Full ROM. Normal prayer sign. Negative Lhermitte's sign. No periuncle erythema  Skin: no nail pitting, alopecia, rash  Neuro: nl cranial nerves, strength, sensation, DTRs.   Psych: nl judgement, orientation, memory, affect.      Labs/Imaging:  Results for orders placed or performed in visit on 04/03/18   Albumin level   Result Value Ref Range    Albumin 4.0 3.5 - 5.0 g/dL   ALT   Result Value Ref Range    ALT 18 8 - 45 U/L   AST   Result Value Ref Range    AST 27 2 - 40 U/L   Creatinine   Result Value Ref Range    Creatinine 0.76 0.57 - 1.11 mg/dL    GFR Estimate >60 ml/min/1.73m2    GFR Estimate If Black >60 ml/min/1.73m2   CRP inflammation   Result Value Ref Range    C-Reactive Protein 0.30 0 - 0.8 mg/dL   CBC with platelets differential   Result Value Ref Range    WBC 4.8 4.5 - 11.0 10^9/L    RBC Count 4.40 4.00 - 5.20 10^12/L    Hemoglobin 13.4 12 - 16 g/dL    Hematocrit 40.2 33 - 51 %    MCV 91 80 - 100 fl    MCH 30.5 26 - 34 pg    MCHC 33.3 32 - 36 g/dL    RDW 13.1 11.5 - 15.5 %    Platelet Count 175 150 - 450 10^9/L    % Neutrophils 68.9 %    % Lymphocytes 17.3 (L) %    % Monocytes 8.4 %    % Eosinophils 4.8 %    % Basophils 0.6 %   Erythrocyte sedimentation rate auto   Result Value Ref Range    Sed Rate 23 <30 mm/hr       Impression/Plan:    1. Sero-positive RA (-RF, -SUSHIL +CCP low positive 60 in 5/2012 outside lab, xrays 3/2014 No erosions) . Labs Nl. RA =moderate  activity. No infections and tolerating medications well.  Based on this, adjust MTX to 22.5 mg (now at 17.5 mg or 0.7 ML). MTX Rx reviewed with patient and given Samuel Thomas RN contact information   --Methotrexate 22.5 mg SQ q7days, aziothioprine 100mg day, folic acid 1 mg day   --DMARD labs in 1 month, then every 8-12 weeks. No alcohol. Hold if any infections, fever, chills or cough  --yearly derm visit hx basal cell skin cancer   --RTC 6 mth with Dr. Montoya   2. Atypical CP, but high risk factors for ASCVD including +FH, hx vasculitis and pleural effusions. Based on this, will refer her to cardiology for complete evaluation   3. OA of the knees (needs TKR L>R). No synovitis. [facility]  4. Right 1st MTP pain, seeing [facility] and will send records to us as recently got x-rays.   5. History of Pleural Effusions: Stable no symptoms now. .   6. Hx of Basal Cell Carcinoma -recent recurrence. YUE for derm and get yearly checks      The patient understood the rational for the diagnosis and treatment plan. Patient shared in the decision making. All questions were answered to best of my ability and the patient's satisfaction  Lj BUSBY, CNP, MSN  [facility]  Department of Rheumatology & Autoimmune Disorders

## 2018-04-05 NOTE — NURSING NOTE
"Chief Complaint   Patient presents with     RECHECK     RA       Initial /78  Pulse 71  Temp 98.4  F (36.9  C) (Oral)  Ht 1.651 m (5' 5\")  Wt 96 kg (211 lb 9.6 oz)  LMP 03/02/2002  SpO2 97%  BMI 35.21 kg/m2 Estimated body mass index is 35.21 kg/(m^2) as calculated from the following:    Height as of this encounter: 1.651 m (5' 5\").    Weight as of this encounter: 96 kg (211 lb 9.6 oz).  Medication Reconciliation: complete   Vicky Gonzalez, CMA      "

## 2018-04-05 NOTE — LETTER
Date: 2019    Jodie Dailey  [address]  [place] [address]      MRN: [id number]  : 1965    Dx:    ICD-10-CM    1. Rheumatoid arthritis involving multiple sites with positive rheumatoid factor (H) M05.79 CBC with platelets differential     AST     ALT     Albumin level     Creatinine     CRP inflammation     Erythrocyte sedimentation rate auto     AST     ALT     Albumin level     Creatinine     CBC with platelets   2. Encounter for long-term current use of medication Z79.899 CBC with platelets differential     AST     ALT     Albumin level     Creatinine     CRP inflammation     Erythrocyte sedimentation rate auto     AST     ALT     Albumin level     Creatinine     CBC with platelets   3. Positive anti-CCP test R76.8 CBC with platelets differential     AST     ALT     Albumin level     Creatinine     CRP inflammation     Erythrocyte sedimentation rate auto     AST     ALT     Albumin level     Creatinine     CBC with platelets   4. Atypical chest pain R07.89      AST     ALT     Albumin level     Creatinine     CBC with platelets   5. History of basal cell carcinoma Z85.828      AST     ALT     Albumin level     Creatinine     CBC with platelets   6. Rheumatoid arthritis of multiple sites without rheumatoid factor (H) M06.09        Orders Placed This Encounter     CBC with platelets differential     Last Lab Result: Hemoglobin (g/dL)       Date                     Value                 03/15/2018               13.4             ----------     Standing Status:   Standing     Number of Occurrences:   6     Standing Expiration Date:   2019     AST     Standing Status:   Standing     Number of Occurrences:   6     Standing Expiration Date:   2019     ALT     Last Lab Result: ALT (U/L)       Date                     Value                 03/15/2018               18               ----------     Standing Status:   Standing     Number of Occurrences:   6     Standing Expiration Date:    4/5/2019     Albumin level     Standing Status:   Standing     Number of Occurrences:   6     Standing Expiration Date:   4/5/2019     Creatinine     Last Lab Result: Creatinine (mg/dL)       Date                     Value                 03/15/2018               0.76             ----------     Standing Status:   Standing     Number of Occurrences:   6     Standing Expiration Date:   4/5/2019     CRP inflammation     Standing Status:   Standing     Number of Occurrences:   6     Standing Expiration Date:   4/5/2019     Erythrocyte sedimentation rate auto     Standing Status:   Standing     Number of Occurrences:   6     Standing Expiration Date:   4/5/2019       Fax results to [phone]    Sincerely,    Lj Robledo,APRN, CNP, MSN  Division of Rheumatic and Autoimmune Diseases  [address] [place] [id number]

## 2018-04-05 NOTE — PATIENT INSTRUCTIONS
PNEUMONIA 23 VACCINE RECOMMENDED     Thank-you for allowing me to participate in your care.     Lj BUSBY, CNP, MSN  [facility]  Department of Rheumatology & Autoimmune Disorders  [facility]: [phone] Opt 2, then Opt 3 Samuel Thomas RN

## 2018-04-05 NOTE — MR AVS SNAPSHOT
After Visit Summary   4/5/2018    Jodie Dailey    MRN: [id number]           Patient Information     Date Of Birth          1965        Visit Information        Provider Department      4/5/2018 3:00 PM Lj Robledo APRN CNP [facility]        Today's Diagnoses     Rheumatoid arthritis involving multiple sites with positive rheumatoid factor (H)    -  1    Encounter for long-term current use of medication        Positive anti-CCP test        Atypical chest pain        History of basal cell carcinoma        Rheumatoid arthritis of multiple sites without rheumatoid factor (H)          Care Instructions    PNEUMONIA 23 VACCINE RECOMMENDED     Thank-you for allowing me to participate in your care.     Lj BUSBY, CNP, MSN  [facility]  Department of Rheumatology & Autoimmune Disorders  [facility] Rheumatology: [phone] Opt 2, then Opt 3 Samuel Thomas RN               Follow-ups after your visit        Additional Services     CARDIOLOGY EVAL ADULT REFERRAL       Preferred location:  [facility] [phone]   [email or web address]    Please be aware that coverage of these services is subject to the terms and limitations of your health insurance plan.  Call member services at your health plan with any benefit or coverage questions.      Please bring the following to your appointment:  Any x-rays, CTs or MRIs which have been performed. Contact the facility where they were done to arrange for  prior to your scheduled appointment.    List of current medications  This referral request   Any documents/labs given to you for this referral                  Follow-up notes from your care team     Return for Labs every 8-12 weeks, Annual Physical Exam with Primary Care Provider, 6 month Dr. Montoya; 1 yr me, Labs in 1 month-Schedule.      Your next 10 appointments  already scheduled     Apr 18, 2018  2:15 PM CDT   New Visit with Christopher Osei MD   [facility] ([facility])    [address]  [address]  [place] [address]   [phone]            Oct 02, 2018  3:00 PM CDT   (Arrive by 2:45 PM)   Return Visit with Ubaldo Montoya MD   [facility] ([facility])    [address]  [address]  [place] [address] [phone]            Apr 03, 2019  3:00 PM CDT   (Arrive by 2:45 PM)   Return Visit with QI Narayanan CNP   [facility] ([facility])    [address]  [address]  [place] [address] [phone]              Future tests that were ordered for you today     Open Standing Orders        Priority Remaining Interval Expires Ordered    AST Routine 6/6 every 8-12 week 4/5/2019 4/5/2018    ALT Routine 6/6 every 8-12 week 4/5/2019 4/5/2018    Albumin level Routine 6/6 every 8-12 week 4/5/2019 4/5/2018    Creatinine Routine 6/6 every 8-12 week 4/5/2019 4/5/2018    CRP inflammation Routine 6/6 every 8-12 week 4/5/2019 4/5/2018    Erythrocyte sedimentation rate auto Routine 6/6 every 8-12 week 4/5/2019 4/5/2018    CBC with platelets differential Routine 6/6 every 8-12 week 4/5/2019 4/5/2018          Open Future Orders        Priority Expected Expires Ordered    Albumin level Routine 5/5/2018 5/18/2018 4/5/2018    Creatinine Routine 5/5/2018 5/18/2018 4/5/2018    CBC with platelets Routine 5/5/2018 5/18/2018 4/5/2018    AST Routine 5/5/2018 5/18/2018 4/5/2018    ALT Routine 5/5/2018 5/18/2018 4/5/2018            Who to contact     If you have questions or need follow up information about today's clinic visit or your schedule please contact [facility] R[facility]AT[facility] directly at [phone].  Normal or non-critical lab and imaging results will be communicated to you by MyChart, letter or phone within 4 business days  "after the clinic has received the results. If you do not hear from us within 7 days, please contact the clinic through "[employer] or phone. If you have a critical or abnormal lab result, we will notify you by phone as soon as possible.  Submit refill requests through "[employer] or call your pharmacy and they will forward the refill request to us. Please allow 3 business days for your refill to be completed.          Additional Information About Your Visit        "[employer] Information     "[employer] gives you secure access to your electronic health record. If you see a primary care provider, you can also send messages to your care team and make appointments. If you have questions, please call your primary care clinic.  If you do not have a primary care provider, please call [phone] and they will assist you.        Care EveryWhere ID     This is your Care EveryWhere ID. This could be used by other organizations to access your [place] medical records  [id number]        Your Vitals Were     Pulse Temperature Height Last Period Pulse Oximetry BMI (Body Mass Index)    71 98.4  F (36.9  C) (Oral) 1.651 m (5' 5\") 03/02/2002 97% 35.21 kg/m2       Blood Pressure from Last 3 Encounters:   04/05/18 120/78   10/05/17 146/85   03/14/17 145/81    Weight from Last 3 Encounters:   04/05/18 96 kg (211 lb 9.6 oz)   10/05/17 95.3 kg (210 lb)   03/14/17 94.3 kg (208 lb)              We Performed the Following     CARDIOLOGY EVAL ADULT REFERRAL          Today's Medication Changes          These changes are accurate as of 4/5/18  4:00 PM.  If you have any questions, ask your nurse or doctor.               These medicines have changed or have updated prescriptions.        Dose/Directions    folic acid 1 MG tablet   Commonly known as:  FOLVITE   This may have changed:  Another medication with the same name was removed. Continue taking this medication, and follow the directions you see here.   Used for:  Rheumatoid arthritis (H)   Changed by:  " "Lj Robledo APRN CNP        TAKE 1 TABLET BY MOUTH TWO  TIMES DAILY   Quantity:  180 tablet   Refills:  1       methotrexate sodium (pres-free) 50 MG/2ML Soln injection CHEMO   This may have changed:    - how much to take  - how to take this  - when to take this  - additional instructions   Used for:  Rheumatoid arthritis of multiple sites without rheumatoid factor (H)   Changed by:  Lj Robledo APRN CNP        Give 0.9 mL (22.5 mg) subcutaneously every 7 days.   Quantity:  24 mL   Refills:  0            Where to get your medicines      These medications were sent to [id number] - [place] - [address]  [address] [address]     Phone:  [phone]     methotrexate sodium (pres-free) 50 MG/2ML Soln injection CHEMO    tuberculin-allergy syringes 27G X 1/2\" 1 ML Misc                Primary Care Provider Office Phone # Fax #    Marga Turner [phone]       [address] [address]        Equal Access to Services     [facility]: Hadii aad ku hadasho Soomaali, waaxda luqadaha, qaybta kaalmada adeegyada, brady staples . So [facility] [phone].    ATENCIÓN: Si habla español, tiene a kate disposición servicios gratuitos de asistencia lingüística. [facility] [phone].    We comply with applicable federal civil rights laws and [place] laws. We do not discriminate on the basis of race, color, national origin, age, disability, sex, sexual orientation, or gender identity.            Thank you!     Thank you for choosing [facility] RHEUMATOLOG[facility]  for your care. Our goal is always to provide you with excellent care. Hearing back from our patients is one way we can continue to improve our services. Please take a few minutes to complete the written survey that you may receive in the mail after your visit with us. Thank you!             Your Updated Medication List - Protect others around you: " "Learn how to safely use, store and throw away your medicines at www.disposemymeds.org.          This list is accurate as of 4/5/18  4:00 PM.  Always use your most recent med list.                   Brand Name Dispense Instructions for use Diagnosis    * azaTHIOprine 50 MG tablet    IMURAN          * azaTHIOprine 50 MG tablet    IMURAN    180 tablet    Take 2 tablets (100 mg) by mouth daily    Rheumatoid arthritis of multiple sites without rheumatoid factor (H)       B-6 PO      Take 1 tablet by mouth daily.        CALCIUM 500 + D PO      Take 1 tablet by mouth daily.        [id number] 3-PHYTOSTER PO           folic acid 1 MG tablet    FOLVITE    180 tablet    TAKE 1 TABLET BY MOUTH TWO  TIMES DAILY    Rheumatoid arthritis (H)       * hydrochlorothiazide 25 MG tablet    HYDRODIURIL     Take 1 tablet by mouth daily.        * hydrochlorothiazide 25 MG tablet    HYDRODIURIL     Take 25 mg by mouth        methotrexate sodium (pres-free) 50 MG/2ML Soln injection CHEMO     24 mL    Give 0.9 mL (22.5 mg) subcutaneously every 7 days.    Rheumatoid arthritis of multiple sites without rheumatoid factor (H)       tuberculin-allergy syringes 27G X 1/2\" 1 ML [facility]    B-D TB SYRINGE    100 each    USE AS DIRECTED FOR WEEKLY INJECTIONS OF METHOTREXATE    Rheumatoid arthritis involving multiple sites with positive rheumatoid factor (H), History of basal cell carcinoma       valACYclovir 1000 mg tablet    VALTREX     Take 1 g by mouth        VITAMIN B12 PO      Take 1 tablet by mouth daily.        * Notice:  This list has 4 medication(s) that are the same as other medications prescribed for you. Read the directions carefully, and ask your doctor or other care provider to review them with you.      "

## 2018-04-05 NOTE — PROGRESS NOTES
[facility] - Rheumatology Clinic Visit        Primary care provider: Rommel Hector    Jodie Dailey  is a 52 year old with sero-positive RA (Neg SUSHIL, no erosions on xray 3/2014, -RF/ low positive 60 CCP in 5/2012). Continues with methotrexate 17.5mg week, azothioprine 100mg day [decreased 10/2011 from 150mg day] folic acid, medrol dose pack May 2012.     Copy forward MARCH 14, 2017.  Since we have last seen the patient, it has been about 15 months.  She really feels she has been pretty stable overall.  In general, she has had no joint with more than 15 minutes of morning stiffness.  She did have some pain over the last several months, particularly in her right shoulder with certain extremes of range of motion, but she thinks she is doing pretty well today and cannot necessarily reproduce those.  Her right foot will hurt her at times and the most persistent of her pains is lateral pain over the right hip.  She tends to sleep on that every night, and her description and location of it would be consistent with trochanteric bursitis.       She did also recently get her labs done.  She remains on the methotrexate at 18 mg weekly together with Imuran 100 mg q. day.  On that combination, she has no laboratory abnormalities whatsoever and her labs have been completely stable over the last year.  She also denies any fevers, chills, or signs or symptoms of infection.     April 5, 2018  Reports taking MTX 0.7 ML or 17.5 mg once week SQ SUN injections, FA 1 mg day and imuran 100 mg once a day. Tolerating. Labs today Nl  No infections or changes to her health. She did have the basal skin cancer taken off her thigh and face since I seen her last, and continues the annual dermatology checks at [facility]. Will be getting mammogram and PAP later this month. No recent cortisone at [facility], does have bone on bone in her knees but does not wish for surgery. In menopause getting hot flashes.  Migratory RA flares left hand, then right foot then now right wrist/2nd finger lasting a few days had swelling with this. No neck pains. EAS <30 min with waking with hands achey. No LARIOS but noticing midsternal CP at rest the past few month, not with exertion nor any nausea, sweating or other symptoms. No breathing issues. Will get stiff with gelling in her joints wanda hips and knees. Due for her annual physical and pneumonia 23 vaccine (she will do with PCP).     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 no recurrent lesions felt d/t drug, pneumonia, high cholesterol, lymes, chronic sinus, ZANDER     Surgical-  Past Surgical History:   Procedure Laterality Date     C STEREOTACTIC BREAST BIOPSY  01/28/1999    L     HC EXC BENIGN SKIN LESION TRUNK/ARM/LEG 0.6-1.0 CM  01/28/1999    R axilla     HC EXCISION BREAST LESION, OPEN >=1  01/28/1999    L     HC PLACE NEEDLE WIRE PREOP, BREAST, INITIAL  01/28/1999    L     RW GENERAL SURG (ABSTRACTED)  [id number]    right knee surgery ,knee cap     RW OTHER (ABSTRACTED)  2001    sinus surgery,polyps removed       FH:  No autoimmune disorders, psoriasis, UC, crohn's, SLE, RA, PsA, gout.  No MS or cancer in family  Mother-lymes, macular degeneration   Father-healthy  Brother-celiac  Niece-celiac   PGM/PGF-heart disease  MGM-cancer tumor lung      SH:  No Alcohol. NO Smoking. No IVDU. Children. Right handed.        PMSH personally reviewed and updated by me.    ROS:  Negative raynaud s phenomena, hairloss, sun sensitivities, keratoconjunctivitis sicca, pleurisy, inflammatory joint symptoms, significant rashes like malar, oral/nasal or ulcerations, inflammatory eye disease, inflammatory bowel disease, dactylitis,  "tenosynovitis, or enthespathy. No temporal headache, no jaw claudication, no scalp tenderness, vision changes, carotidynia, cough  +see above   CONSTITUTIONAL: No fevers, night sweats or unintentional weight change. No acute distress, swollen glands  EYES: No vision change, diplopia, pain in eyes or red eyes   EARS, NOSE, MOUTH, THROAT: No tinnitus or hearing change, no epistaxis or nasal discharge, no oral lesions, throat clear. Normal saliva pool.  No drymouth. No thyroid  enlargement  CARDIOVASCULAR: No palpitations, or pain with walking, no orthopnea or PND + see above   RESPIRATORY: No dyspnea, cough, shortness of breath or wheezing. No pleurisy.   GI: No nausea, vomiting, diarrhea or constipation, no abdominal pain, or blood in stools.   : No change in urine, no dysuria or hematuria   MUSCKL: see above    INTEGUMENTARY: No concerning lesions or moles   NEURO: No loss of strength or sensation, no numbness or tingling, no tremor, no dizziness, no headache. No falls   ENDO: No polyuria or polydipsia, no temperature intolerance   HEME/LYMPH:No concerning bumps, bleeding problems, or swollen lymph nodes. No recent infections, hospitalizations or new illnesses.   ALLERGY: No environmental allergies   PSYCH:No depression or anxiety, no sleep problems.  Otherwise 14 point ROS obtained, reviewed and found negative.     Physical exam:  Vitals: Blood pressure 120/78, pulse 71, temperature 98.4  F (36.9  C), temperature source Oral, height 1.651 m (5' 5\"), weight 96 kg (211 lb 9.6 oz), last menstrual period 03/02/2002, SpO2 97 %.  Wt Readings from Last 4 Encounters:   04/05/18 96 kg (211 lb 9.6 oz)   10/05/17 95.3 kg (210 lb)   03/14/17 94.3 kg (208 lb)   12/08/15 84.4 kg (186 lb)     Constitutional: WD-WN-WG cooperative  Eyes: nl EOM, PERRLA, vision, conjunctiva, sclera  ENT: nl external ears, nose, hearing, lips, teeth, gums, throat  Neck: no mass or thyroid enlargement  Resp: lungs clear to auscultation, nl to " palpation, nl effort  CV: RRR, no murmurs, rubs or gallops, no edema  GI: no ABD mass or tenderness, no HSM  : not tested  Lymph: no cervical or epitrochlear nodes  MS: +s/t right wrist, right 2nd finger, 2-3 MCP. halgux valgus  Knees +creptitus with halgus valgus, right 1st MTP bunion, prominent but not swollen or red. All other TMJ, neck, shoulder, elbow, wrist, hand, spine, hip, knee, ankle, and foot joints were examined and otherwise found normal. Normal  strength. Full ROM. Normal prayer sign. Negative Lhermitte's sign. No periuncle erythema  Skin: no nail pitting, alopecia, rash  Neuro: nl cranial nerves, strength, sensation, DTRs.   Psych: nl judgement, orientation, memory, affect.      Labs/Imaging:  Results for orders placed or performed in visit on 04/03/18   Albumin level   Result Value Ref Range    Albumin 4.0 3.5 - 5.0 g/dL   ALT   Result Value Ref Range    ALT 18 8 - 45 U/L   AST   Result Value Ref Range    AST 27 2 - 40 U/L   Creatinine   Result Value Ref Range    Creatinine 0.76 0.57 - 1.11 mg/dL    GFR Estimate >60 ml/min/1.73m2    GFR Estimate If Black >60 ml/min/1.73m2   CRP inflammation   Result Value Ref Range    C-Reactive Protein 0.30 0 - 0.8 mg/dL   CBC with platelets differential   Result Value Ref Range    WBC 4.8 4.5 - 11.0 10^9/L    RBC Count 4.40 4.00 - 5.20 10^12/L    Hemoglobin 13.4 12 - 16 g/dL    Hematocrit 40.2 33 - 51 %    MCV 91 80 - 100 fl    MCH 30.5 26 - 34 pg    MCHC 33.3 32 - 36 g/dL    RDW 13.1 11.5 - 15.5 %    Platelet Count 175 150 - 450 10^9/L    % Neutrophils 68.9 %    % Lymphocytes 17.3 (L) %    % Monocytes 8.4 %    % Eosinophils 4.8 %    % Basophils 0.6 %   Erythrocyte sedimentation rate auto   Result Value Ref Range    Sed Rate 23 <30 mm/hr       Impression/Plan:    1. Sero-positive RA (-RF, -SUSHIL +CCP low positive 60 in 5/2012 outside lab, xrays 3/2014 No erosions) . Labs Nl. RA =moderate activity. No infections and tolerating medications well.  Based on this,  adjust MTX to 22.5 mg (now at 17.5 mg or 0.7 ML). MTX Rx reviewed with patient and given Samuel Thomas RN contact information   --Methotrexate 22.5 mg SQ q7days, aziothioprine 100mg day, folic acid 1 mg day   --DMARD labs in 1 month, then every 8-12 weeks. No alcohol. Hold if any infections, fever, chills or cough  --yearly derm visit hx basal cell skin cancer   --RTC 6 mth with Dr. Montoya   2. Atypical CP, but high risk factors for ASCVD including +FH, hx vasculitis and pleural effusions. Based on this, will refer her to cardiology for complete evaluation   3. OA of the knees (needs TKR L>R). No synovitis. [facility]  4. Right 1st MTP pain, seeing [facility] and will send records to us as recently got x-rays.   5. History of Pleural Effusions: Stable no symptoms now. .   6. Hx of Basal Cell Carcinoma -recent recurrence. YUE for derm and get yearly checks      The patient understood the rational for the diagnosis and treatment plan. Patient shared in the decision making. All questions were answered to best of my ability and the patient's satisfaction  Lj Robledo APRN, CNP, MSN  [facility]  Department of Rheumatology & Autoimmune Disorders

## 2018-04-18 ENCOUNTER — OFFICE VISIT (OUTPATIENT)
Dept: CARDIOLOGY | Facility: CLINIC | Age: 53
End: 2018-04-18
Payer: COMMERCIAL

## 2018-04-18 VITALS
BODY MASS INDEX: 35.23 KG/M2 | WEIGHT: 211.7 LBS | HEART RATE: 84 BPM | SYSTOLIC BLOOD PRESSURE: 142 MMHG | OXYGEN SATURATION: 97 % | DIASTOLIC BLOOD PRESSURE: 80 MMHG

## 2018-04-18 DIAGNOSIS — M05.79 RHEUMATOID ARTHRITIS INVOLVING MULTIPLE SITES WITH POSITIVE RHEUMATOID FACTOR (H): ICD-10-CM

## 2018-04-18 DIAGNOSIS — R07.89 ATYPICAL CHEST PAIN: Primary | ICD-10-CM

## 2018-04-18 DIAGNOSIS — I10 BENIGN ESSENTIAL HYPERTENSION: ICD-10-CM

## 2018-04-18 PROCEDURE — 99214 OFFICE O/P EST MOD 30 MIN: CPT | Performed by: INTERNAL MEDICINE

## 2018-04-18 RX ORDER — AMOXICILLIN 500 MG/1
500 CAPSULE ORAL
COMMUNITY
Start: 2018-04-17 | End: 2018-04-27

## 2018-04-18 NOTE — PROGRESS NOTES
Service Date: 04/18/2018      REQUESTING PROVIDER:  Lj Robledo NP from Rheumatology.      INDICATION:  Chest pain.      HISTORY OF PRESENT ILLNESS:  Ms. Lea is a pleasant 52-year-old female with a longstanding history of rheumatoid arthritis and essential hypertension who is referred to Cardiology because of atypical chest pain.      Approximately 6 months ago, the patient began to have a sharp chest pain on the left side of her sternum.  This can last for up to a minute to 2 minutes.  It is not associated with any radiation or other types of chest discomfort.  It is not associated with any nausea or shortness of breath.  She has also a separate chest heaviness which can occur at random times.  The heaviness feels like a slight pressure on her chest and does not occur with exertion.  It also is not associated with any shortness of breath.  She denies any other cardiovascular complaints, denying any other types of chest pain, pressure, shortness of breath, orthopnea or paroxysmal nocturnal dyspnea.      Please see my separate note with her full physical examination.      IMPRESSION AND PLAN:  Ms. Lea is a pleasant 52-year-old female with risk factors for coronary disease including rheumatoid arthritis and benign essential hypertension who is having atypical chest pain.  I would like to risk stratify the patient with a calcium score.  The patient understands that this would cost likely 99 dollars.  We will also have the patient undergo an exercise stress echocardiogram.  I will plan to see the patient back after this testing has been completed.  Further recommendations will be based on the results of the studies.  I suspect she will likely have a normal stress test with an abnormal calcium score indicating nonobstructive disease for which she will need to be placed on antiplatelet and statin.         JAZMYNE ROCHA MD             D: 04/18/2018   T: 04/18/2018   MT: HERNANDO      Name:     RUDY LEA   MRN:       [id number]        Account:      [id number]   :      1965           Service Date: 2018      Document: [id number]

## 2018-04-18 NOTE — LETTER
"4/18/2018    Marga CARON Yue  [facility] [address] [facility] [address]    RE: Jodie RONQUILLO Dailey       Dear Colleague,    I had the pleasure of seeing Jodie Mortonuer in the [facility].    HPI and Plan:   See dictation    Orders Placed This Encounter   Procedures     CT Coronary Calcium Scan     Follow-Up with Cardiologist     Exercise Stress Echocardiogram       Orders Placed This Encounter   Medications     amoxicillin (AMOXIL) 500 MG capsule     Sig: Take 500 mg by mouth       Medications Discontinued During This Encounter   Medication Reason     azaTHIOprine (IMURAN) 50 MG tablet Medication Reconciliation Clean Up     hydrochlorothiazide (HYDRODIURIL) 25 MG tablet Medication Reconciliation Clean Up         Encounter Diagnoses   Name Primary?     Atypical chest pain Yes     Benign essential hypertension      Rheumatoid arthritis involving multiple sites with positive rheumatoid factor (H)        CURRENT MEDICATIONS:  Current Outpatient Prescriptions   Medication Sig Dispense Refill     amoxicillin (AMOXIL) 500 MG capsule Take 500 mg by mouth       azaTHIOprine (IMURAN) 50 MG tablet Take 2 tablets (100 mg) by mouth daily 180 tablet 0     Calcium Carbonate-Vitamin D (CALCIUM 500 + D PO) Take 1 tablet by mouth daily.       Cyanocobalamin (VITAMIN B12 PO) Take 1 tablet by mouth daily.       [id number]-[id number]-OMEGA 3-PHYTOSTER PO        folic acid (FOLVITE) 1 MG tablet TAKE 1 TABLET BY MOUTH TWO  TIMES DAILY 180 tablet 1     hydrochlorothiazide (HYDRODIURIL) 25 MG tablet Take 1 tablet by mouth daily.       methotrexate sodium, pres-free, 50 MG/2ML SOLN injection CHEMO Give 0.9 mL (22.5 mg) subcutaneously every 7 days. 24 mL 0     Pyridoxine HCl (B-6 PO) Take 1 tablet by mouth daily.       tuberculin-allergy syringes (B-D TB SYRINGE) 27G X 1/2\" 1 ML MISC USE AS DIRECTED FOR WEEKLY INJECTIONS OF METHOTREXATE 100 each 0     valACYclovir (VALTREX) 1000 mg tablet Take 1 g by " mouth       [DISCONTINUED] azaTHIOprine (IMURAN) 50 MG tablet          ALLERGIES     Allergies   Allergen Reactions     No Known Allergies      Seasonal Allergies      Ragweed, dust mites       PAST MEDICAL HISTORY:  Past Medical History:   Diagnosis Date     Anemia     History.      Basal cell cancer 3/2014     Benign neoplasm of breast 01/28/1999    L     HTN (hypertension)     On treatment     Left ankle injury 11/2010    Twisted ankle (resolved). 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 Probable drug induced     Nasal polyps      Obesity, unspecified     minimal     Pleural effusions 1999    At initial dx of RA. Not worse. SOB with stairs. Cardiac work-up negative. Past told was from scar tissure in lungs 1999.      Pneumonia 1999     Post term pregnancy, delivered with or without mention of antepartum condition     childbirth x 2     Premenopausal patient     Hot flashes.      Pure hypercholesterolemia     elevated 1998     RA (rheumatoid arthritis) (H) 1999    +anti-CCP 60 5/2012; 3/2008 x-rays no erosions hands/feet       PAST SURGICAL HISTORY:  Past Surgical History:   Procedure Laterality Date     C STEREOTACTIC BREAST BIOPSY  01/28/1999    L     HC EXC BENIGN SKIN LESION TRUNK/ARM/LEG 0.6-1.0 CM  01/28/1999    R axilla     HC EXCISION BREAST LESION, OPEN >=1  01/28/1999    L     HC PLACE NEEDLE WIRE PREOP, BREAST, INITIAL  01/28/1999    L     RW GENERAL SURG (ABSTRACTED)  [id number]    right knee surgery ,knee cap     RW OTHER (ABSTRACTED)  2001    sinus surgery,polyps removed       FAMILY HISTORY:  Family History   Problem Relation Age of Onset     CANCER Maternal Grandmother      Brain, lung     HEART DISEASE Paternal Grandmother      MI, asthma     Other - See Comments Brother      brother and niece with celiac     DIABETES No family hx of      10 brothers and  sisters        SOCIAL HISTORY:  Social History     Social History     Marital status:      Spouse name: N/A     Number of children: N/A     Years of education: N/A     Social History Main Topics     Smoking status: Never Smoker     Smokeless tobacco: Never Used     Alcohol use No      Comment: rare     Drug use: No     Sexual activity: Not Asked     Other Topics Concern     Blood Transfusions No     Sleep Concern No     Stress Concern No     Weight Concern Yes     Special Diet No     Back Care No     Exercise Yes     Seat Belt Yes     Self-Exams Yes     Social History Narrative    Works in airline       Review of Systems:  Skin:  Negative       Eyes:  Positive for glasses    ENT:  Negative      Respiratory:  Positive for shortness of breath     Cardiovascular:    chest pain;Positive for;heaviness;lightheadedness;dizziness    Gastroenterology: Negative      Genitourinary:  Negative      Musculoskeletal:  Positive for arthritis;joint pain;joint swelling;joint stiffness    Neurologic:  Positive for numbness or tingling of hands    Psychiatric:  Negative      Heme/Lymph/Imm:  Negative      Endocrine:  Negative        Physical Exam:  Vitals: /80 (BP Location: Right arm, Patient Position: Chair, Cuff Size: Adult Regular)  Pulse 84  Wt 96 kg (211 lb 11.2 oz)  LMP 03/02/2002  SpO2 97%  BMI 35.23 kg/m2    Constitutional:  cooperative;in no acute distress        Skin:  warm and dry to the touch          Head:  normocephalic        Eyes:  sclera white        Lymph:No Cervical lymphadenopathy present     ENT:  no pallor or cyanosis        Neck:  no stiffness        Respiratory:  clear to auscultation         Cardiac: regular rhythm;normal S1 and S2                pulses full and equal                                        GI:  abdomen soft        Extremities and Muscular Skeletal:  no edema              Neurological:  affect appropriate        Psych:  affect appropriate, oriented to time, person and place         CC  No referring provider defined for this encounter.                Thank you for allowing me to participate in the care of your patient.      Sincerely,     Christopher Osei MD     [facility]    cc:   No referring provider defined for this encounter.

## 2018-04-18 NOTE — PROGRESS NOTES
"HPI and Plan:   See dictation    Orders Placed This Encounter   Procedures     CT Coronary Calcium Scan     Follow-Up with Cardiologist     Exercise Stress Echocardiogram       Orders Placed This Encounter   Medications     amoxicillin (AMOXIL) 500 MG capsule     Sig: Take 500 mg by mouth       Medications Discontinued During This Encounter   Medication Reason     azaTHIOprine (IMURAN) 50 MG tablet Medication Reconciliation Clean Up     hydrochlorothiazide (HYDRODIURIL) 25 MG tablet Medication Reconciliation Clean Up         Encounter Diagnoses   Name Primary?     Atypical chest pain Yes     Benign essential hypertension      Rheumatoid arthritis involving multiple sites with positive rheumatoid factor (H)        CURRENT MEDICATIONS:  Current Outpatient Prescriptions   Medication Sig Dispense Refill     amoxicillin (AMOXIL) 500 MG capsule Take 500 mg by mouth       azaTHIOprine (IMURAN) 50 MG tablet Take 2 tablets (100 mg) by mouth daily 180 tablet 0     Calcium Carbonate-Vitamin D (CALCIUM 500 + D PO) Take 1 tablet by mouth daily.       Cyanocobalamin (VITAMIN B12 PO) Take 1 tablet by mouth daily.       MS-R8-[id number]-D-OMEGA 3-PHYTOSTER PO        folic acid (FOLVITE) 1 MG tablet TAKE 1 TABLET BY MOUTH TWO  TIMES DAILY 180 tablet 1     hydrochlorothiazide (HYDRODIURIL) 25 MG tablet Take 1 tablet by mouth daily.       methotrexate sodium, pres-free, 50 MG/2ML SOLN injection CHEMO Give 0.9 mL (22.5 mg) subcutaneously every 7 days. 24 mL 0     Pyridoxine HCl (B-6 PO) Take 1 tablet by mouth daily.       tuberculin-allergy syringes (B-D TB SYRINGE) 27G X 1/2\" 1 ML MISC USE AS DIRECTED FOR WEEKLY INJECTIONS OF METHOTREXATE 100 each 0     valACYclovir (VALTREX) 1000 mg tablet Take 1 g by mouth       [DISCONTINUED] azaTHIOprine (IMURAN) 50 MG tablet          ALLERGIES     Allergies   Allergen Reactions     No Known Allergies      Seasonal Allergies      Ragweed, dust mites       PAST MEDICAL HISTORY:  Past Medical History: "   Diagnosis Date     Anemia     History.      Basal cell cancer 3/2014     Benign neoplasm of breast 01/28/1999    L     HTN (hypertension)     On treatment     Left ankle injury 11/2010    Twisted ankle (resolved). 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 Probable drug induced     Nasal polyps      Obesity, unspecified     minimal     Pleural effusions 1999    At initial dx of RA. Not worse. SOB with stairs. Cardiac work-up negative. Past told was from scar tissure in lungs 1999.      Pneumonia 1999     Post term pregnancy, delivered with or without mention of antepartum condition     childbirth x 2     Premenopausal patient     Hot flashes.      Pure hypercholesterolemia     elevated 1998     RA (rheumatoid arthritis) (H) 1999    +anti-CCP 60 5/2012; 3/2008 x-rays no erosions hands/feet       PAST SURGICAL HISTORY:  Past Surgical History:   Procedure Laterality Date     C STEREOTACTIC BREAST BIOPSY  01/28/1999    L     HC EXC BENIGN SKIN LESION TRUNK/ARM/LEG 0.6-1.0 CM  01/28/1999    R axilla     HC EXCISION BREAST LESION, OPEN >=1  01/28/1999    L     HC PLACE NEEDLE WIRE PREOP, BREAST, INITIAL  01/28/1999    L     RW GENERAL SURG (ABSTRACTED)  [id number]    right knee surgery ,knee cap     RW OTHER (ABSTRACTED)  2001    sinus surgery,polyps removed       FAMILY HISTORY:  Family History   Problem Relation Age of Onset     CANCER Maternal Grandmother      Brain, lung     HEART DISEASE Paternal Grandmother      MI, asthma     Other - See Comments Brother      brother and niece with celiac     DIABETES No family hx of      10 brothers and sisters        SOCIAL HISTORY:  Social History     Social History     Marital status:      Spouse name: N/A     Number of children: N/A     Years of education: N/A     Social History Main Topics     Smoking status: Never Smoker      Smokeless tobacco: Never Used     Alcohol use No      Comment: rare     Drug use: No     Sexual activity: Not Asked     Other Topics Concern     Blood Transfusions No     Sleep Concern No     Stress Concern No     Weight Concern Yes     Special Diet No     Back Care No     Exercise Yes     Seat Belt Yes     Self-Exams Yes     Social History Narrative    Works in airline       Review of Systems:  Skin:  Negative       Eyes:  Positive for glasses    ENT:  Negative      Respiratory:  Positive for shortness of breath     Cardiovascular:    chest pain;Positive for;heaviness;lightheadedness;dizziness    Gastroenterology: Negative      Genitourinary:  Negative      Musculoskeletal:  Positive for arthritis;joint pain;joint swelling;joint stiffness    Neurologic:  Positive for numbness or tingling of hands    Psychiatric:  Negative      Heme/Lymph/Imm:  Negative      Endocrine:  Negative        Physical Exam:  Vitals: /80 (BP Location: Right arm, Patient Position: Chair, Cuff Size: Adult Regular)  Pulse 84  Wt 96 kg (211 lb 11.2 oz)  LMP 03/02/2002  SpO2 97%  BMI 35.23 kg/m2    Constitutional:  cooperative;in no acute distress        Skin:  warm and dry to the touch          Head:  normocephalic        Eyes:  sclera white        Lymph:No Cervical lymphadenopathy present     ENT:  no pallor or cyanosis        Neck:  no stiffness        Respiratory:  clear to auscultation         Cardiac: regular rhythm;normal S1 and S2                pulses full and equal                                        GI:  abdomen soft        Extremities and Muscular Skeletal:  no edema              Neurological:  affect appropriate        Psych:  affect appropriate, oriented to time, person and place        CC  No referring provider defined for this encounter.

## 2018-04-18 NOTE — MR AVS SNAPSHOT
After Visit Summary   4/18/2018    Jodie Dailey    MRN: [id number]           Patient Information     Date Of Birth          1965        Visit Information        Provider Department      4/18/2018 2:15 PM Christopher Osei MD [facility]   [place]        Today's Diagnoses     Atypical chest pain    -  1    Benign essential hypertension        Rheumatoid arthritis involving multiple sites with positive rheumatoid factor (H)           Follow-ups after your visit        Additional Services     Follow-Up with Cardiologist                 Your next 10 appointments already scheduled     Oct 02, 2018  3:00 PM CDT   (Arrive by 2:45 PM)   Return Visit with Ubaldo Montoya MD   [facility] Rheumatology ([facility])    [address] [place] [address]  [address]  [facility] [address] [phone]            Apr 03, 2019  3:00 PM CDT   (Arrive by 2:45 PM)   Return Visit with QI Narayanan [facility] Rheumatology ([facility])    [address] [place] [address]  [address]  [facility] [address] [phone]              Future tests that were ordered for you today     Open Future Orders        Priority Expected Expires Ordered    Follow-Up with Cardiologist Routine 5/18/2018 4/18/2019 4/18/2018    CT Coronary Calcium Scan Routine 4/19/2018 4/18/2019 4/18/2018    Exercise Stress Echocardiogram Routine 4/25/2018 4/18/2019 4/18/2018            Who to contact     If you have questions or need follow up information about today's clinic visit or your schedule please contact [facility]   [facility] directly at [phone].  Normal or non-critical lab and imaging results will be communicated to you by MyChart, letter or phone within 4 business days after the clinic has received the results. If you do not hear from us within 7 days, please contact the clinic through MyChart or phone. If you have a critical or abnormal  lab result, we will notify you by phone as soon as possible.  Submit refill requests through [employer] or call your pharmacy and they will forward the refill request to us. Please allow 3 business days for your refill to be completed.          Additional Information About Your Visit        Apert[employer] Information     [employer] gives you secure access to your electronic health record. If you see a primary care provider, you can also send messages to your care team and make appointments. If you have questions, please call your primary care clinic.  If you do not have a primary care provider, please call [phone] and they will assist you.        Care EveryWhere ID     This is your Care EveryWhere ID. This could be used by other organizations to access your [place] medical records  [id number]        Your Vitals Were     Pulse Last Period Pulse Oximetry BMI (Body Mass Index)          84 03/02/2002 97% 35.23 kg/m2         Blood Pressure from Last 3 Encounters:   04/18/18 142/80   04/05/18 120/78   10/05/17 146/85    Weight from Last 3 Encounters:   04/18/18 96 kg (211 lb 11.2 oz)   04/05/18 96 kg (211 lb 9.6 oz)   10/05/17 95.3 kg (210 lb)               Primary Care Provider Office Phone # Fax #    Marga Turner [phone]       [address] [address]        Equal Access to Services     SERA HENRY : Hadii aad ku hadasho Soomaali, waaxda luqadaha, qaybta kaalmada aderubyyada, brady staples . So [facility] [phone].    ATENCIÓN: Si habla español, tiene a kate disposición servicios gratuitos de asistencia lingüística. Gemini al [phone].    We comply with applicable federal civil rights laws and [place] laws. We do not discriminate on the basis of race, color, national origin, age, disability, sex, sexual orientation, or gender identity.            Thank you!     Thank you for choosing [facility]   [place]   "for your care. Our goal is always to provide you with excellent care. Hearing back from our patients is one way we can continue to improve our services. Please take a few minutes to complete the written survey that you may receive in the mail after your visit with us. Thank you!             Your Updated Medication List - Protect others around you: Learn how to safely use, store and throw away your medicines at www.disposemymeds.org.          This list is accurate as of 4/18/18  2:16 PM.  Always use your most recent med list.                   Brand Name Dispense Instructions for use Diagnosis    amoxicillin 500 MG capsule    AMOXIL     Take 500 mg by mouth        azaTHIOprine 50 MG tablet    IMURAN    180 tablet    Take 2 tablets (100 mg) by mouth daily    Rheumatoid arthritis of multiple sites without rheumatoid factor (H)       B-6 PO      Take 1 tablet by mouth daily.        CALCIUM 500 + D PO      Take 1 tablet by mouth daily.        [id number]-D-OMEGA 3-PHYTOSTER PO           folic acid 1 MG tablet    FOLVITE    180 tablet    TAKE 1 TABLET BY MOUTH TWO  TIMES DAILY    Rheumatoid arthritis (H)       hydrochlorothiazide 25 MG tablet    HYDRODIURIL     Take 1 tablet by mouth daily.        methotrexate sodium (pres-free) 50 MG/2ML Soln injection CHEMO     24 mL    Give 0.9 mL (22.5 mg) subcutaneously every 7 days.    Rheumatoid arthritis of multiple sites without rheumatoid factor (H)       tuberculin-allergy syringes 27G X 1/2\" 1 ML [facility]    B-D TB SYRINGE    100 each    USE AS DIRECTED FOR WEEKLY INJECTIONS OF METHOTREXATE    Rheumatoid arthritis involving multiple sites with positive rheumatoid factor (H), History of basal cell carcinoma       valACYclovir 1000 mg tablet    VALTREX     Take 1 g by mouth        VITAMIN B12 PO      Take 1 tablet by mouth daily.          "

## 2018-04-18 NOTE — LETTER
4/18/2018      Marga CARON Yue  [facility] [address] [facility] [address]      RE: Jodie Dailey       Dear Colleague,    I had the pleasure of seeing Jodie Dailey in the [facility].    Service Date: 04/18/2018      REQUESTING PROVIDER:  Lj Robledo NP from Rheumatology.      INDICATION:  Chest pain.      HISTORY OF PRESENT ILLNESS:  Ms. Dailey is a pleasant 52-year-old female with a longstanding history of rheumatoid arthritis and essential hypertension who is referred to Cardiology because of atypical chest pain.      Approximately 6 months ago, the patient began to have a sharp chest pain on the left side of her sternum.  This can last for up to a minute to 2 minutes.  It is not associated with any radiation or other types of chest discomfort.  It is not associated with any nausea or shortness of breath.  She has also a separate chest heaviness which can occur at random times.  The heaviness feels like a slight pressure on her chest and does not occur with exertion.  It also is not associated with any shortness of breath.  She denies any other cardiovascular complaints, denying any other types of chest pain, pressure, shortness of breath, orthopnea or paroxysmal nocturnal dyspnea.      Please see my separate note with her full physical examination.      IMPRESSION AND PLAN:  Ms. Dailey is a pleasant 52-year-old female with risk factors for coronary disease including rheumatoid arthritis and benign essential hypertension who is having atypical chest pain.  I would like to risk stratify the patient with a calcium score.  The patient understands that this would cost likely 99 dollars.  We will also have the patient undergo an exercise stress echocardiogram.  I will plan to see the patient back after this testing has been completed.  Further recommendations will be based on the results of the studies.  I suspect she will likely have a normal stress test with an  "abnormal calcium score indicating nonobstructive disease for which she will need to be placed on antiplatelet and statin.         JAZMYNE OSEI MD             D: 2018   T: 2018   MT: HERNANDO      Name:     RUDY LEA   MRN:      [id number]        Account:      [id number]   :      1965           Service Date: 2018      Document: [id number]           Outpatient Encounter Prescriptions as of 2018   Medication Sig Dispense Refill     amoxicillin (AMOXIL) 500 MG capsule Take 500 mg by mouth       azaTHIOprine (IMURAN) 50 MG tablet Take 2 tablets (100 mg) by mouth daily 180 tablet 0     Calcium Carbonate-Vitamin D (CALCIUM 500 + D PO) Take 1 tablet by mouth daily.       Cyanocobalamin (VITAMIN B12 PO) Take 1 tablet by mouth daily.       [id number]-D-OMEGA 3-PHYTOSTER PO        folic acid (FOLVITE) 1 MG tablet TAKE 1 TABLET BY MOUTH TWO  TIMES DAILY 180 tablet 1     hydrochlorothiazide (HYDRODIURIL) 25 MG tablet Take 1 tablet by mouth daily.       methotrexate sodium, pres-free, 50 MG/2ML SOLN injection CHEMO Give 0.9 mL (22.5 mg) subcutaneously every 7 days. 24 mL 0     Pyridoxine HCl (B-6 PO) Take 1 tablet by mouth daily.       tuberculin-allergy syringes (B-D TB SYRINGE) 27G X 1/2\" 1 ML MISC USE AS DIRECTED FOR WEEKLY INJECTIONS OF METHOTREXATE 100 each 0     valACYclovir (VALTREX) 1000 mg tablet Take 1 g by mouth       [DISCONTINUED] azaTHIOprine (IMURAN) 50 MG tablet        [DISCONTINUED] hydrochlorothiazide (HYDRODIURIL) 25 MG tablet Take 25 mg by mouth       No facility-administered encounter medications on file as of 2018.        Again, thank you for allowing me to participate in the care of your patient.      Sincerely,    Jazmyne Osei MD     [facility]    "

## 2018-04-25 LAB — MAMMOGRAM: NORMAL

## 2018-05-09 ENCOUNTER — HOSPITAL ENCOUNTER (OUTPATIENT)
Dept: CT IMAGING | Facility: CLINIC | Age: 53
Discharge: HOME | End: 2018-05-09
Attending: INTERNAL MEDICINE | Admitting: INTERNAL MEDICINE
Payer: COMMERCIAL

## 2018-05-09 DIAGNOSIS — R07.89 ATYPICAL CHEST PAIN: ICD-10-CM

## 2018-05-09 DIAGNOSIS — I10 BENIGN ESSENTIAL HYPERTENSION: ICD-10-CM

## 2018-05-09 PROCEDURE — 75571 CT HRT W/O DYE W/CA TEST: CPT

## 2018-05-09 PROCEDURE — 75571 CT HRT W/O DYE W/CA TEST: CPT | Mod: 26 | Performed by: INTERNAL MEDICINE

## 2018-05-16 ENCOUNTER — HOSPITAL ENCOUNTER (OUTPATIENT)
Dept: CARDIOLOGY | Facility: CLINIC | Age: 53
Discharge: HOME | End: 2018-05-16
Attending: INTERNAL MEDICINE | Admitting: INTERNAL MEDICINE
Payer: COMMERCIAL

## 2018-05-16 DIAGNOSIS — R07.89 ATYPICAL CHEST PAIN: ICD-10-CM

## 2018-05-16 PROCEDURE — 93350 STRESS TTE ONLY: CPT | Mod: 26 | Performed by: INTERNAL MEDICINE

## 2018-05-16 PROCEDURE — 93018 CV STRESS TEST I&R ONLY: CPT | Performed by: INTERNAL MEDICINE

## 2018-05-16 PROCEDURE — 93016 CV STRESS TEST SUPVJ ONLY: CPT | Performed by: INTERNAL MEDICINE

## 2018-05-16 PROCEDURE — 93350 STRESS TTE ONLY: CPT | Mod: TC

## 2018-05-16 PROCEDURE — 93325 DOPPLER ECHO COLOR FLOW MAPG: CPT | Mod: 26 | Performed by: INTERNAL MEDICINE

## 2018-05-16 PROCEDURE — 25500064 ZZH RX 255 OP 636: Performed by: INTERNAL MEDICINE

## 2018-05-16 PROCEDURE — 93321 DOPPLER ECHO F-UP/LMTD STD: CPT | Mod: 26 | Performed by: INTERNAL MEDICINE

## 2018-05-16 RX ADMIN — HUMAN ALBUMIN MICROSPHERES AND PERFLUTREN 4 ML: 10; .22 INJECTION, SOLUTION INTRAVENOUS at 14:33

## 2018-05-24 DIAGNOSIS — M06.09 RHEUMATOID ARTHRITIS OF MULTIPLE SITES WITHOUT RHEUMATOID FACTOR (H): ICD-10-CM

## 2018-05-25 RX ORDER — AZATHIOPRINE 50 MG/1
TABLET ORAL
Qty: 180 TABLET | OUTPATIENT
Start: 2018-05-25

## 2018-06-04 ENCOUNTER — OFFICE VISIT (OUTPATIENT)
Dept: CARDIOLOGY | Facility: CLINIC | Age: 53
End: 2018-06-04
Attending: INTERNAL MEDICINE
Payer: COMMERCIAL

## 2018-06-04 VITALS
HEIGHT: 65 IN | DIASTOLIC BLOOD PRESSURE: 80 MMHG | WEIGHT: 212 LBS | SYSTOLIC BLOOD PRESSURE: 130 MMHG | HEART RATE: 68 BPM | BODY MASS INDEX: 35.32 KG/M2

## 2018-06-04 DIAGNOSIS — I10 BENIGN ESSENTIAL HYPERTENSION: ICD-10-CM

## 2018-06-04 DIAGNOSIS — R07.89 ATYPICAL CHEST PAIN: ICD-10-CM

## 2018-06-04 DIAGNOSIS — M05.79 RHEUMATOID ARTHRITIS INVOLVING MULTIPLE SITES WITH POSITIVE RHEUMATOID FACTOR (H): ICD-10-CM

## 2018-06-04 PROCEDURE — 99213 OFFICE O/P EST LOW 20 MIN: CPT | Performed by: INTERNAL MEDICINE

## 2018-06-04 NOTE — PROGRESS NOTES
"HPI and Plan:   See dictation    No orders of the defined types were placed in this encounter.      No orders of the defined types were placed in this encounter.      There are no discontinued medications.      Encounter Diagnoses   Name Primary?     Atypical chest pain      Benign essential hypertension      Rheumatoid arthritis involving multiple sites with positive rheumatoid factor (H)        CURRENT MEDICATIONS:  Current Outpatient Prescriptions   Medication Sig Dispense Refill     azaTHIOprine (IMURAN) 50 MG tablet Take 2 tablets (100 mg) by mouth daily 180 tablet 0     Calcium Carbonate-Vitamin D (CALCIUM 500 + D PO) Take 1 tablet by mouth daily.       Cyanocobalamin (VITAMIN B12 PO) Take 1 tablet by mouth daily.       [id number]-P25-D-OMEGA 3-PHYTOSTER PO        folic acid (FOLVITE) 1 MG tablet TAKE 1 TABLET BY MOUTH TWO  TIMES DAILY 180 tablet 1     hydrochlorothiazide (HYDRODIURIL) 25 MG tablet Take 1 tablet by mouth daily.       methotrexate sodium, pres-free, 50 MG/2ML SOLN injection CHEMO Give 0.9 mL (22.5 mg) subcutaneously every 7 days. 24 mL 0     Pyridoxine HCl (B-6 PO) Take 1 tablet by mouth daily.       tuberculin-allergy syringes (B-D TB SYRINGE) 27G X 1/2\" 1 ML MISC USE AS DIRECTED FOR WEEKLY INJECTIONS OF METHOTREXATE 100 each 0     valACYclovir (VALTREX) 1000 mg tablet Take 1 g by mouth         ALLERGIES     Allergies   Allergen Reactions     No Known Allergies      Seasonal Allergies      Ragweed, dust mites       PAST MEDICAL HISTORY:  Past Medical History:   Diagnosis Date     Anemia     History.      Basal cell cancer 3/2014     Benign neoplasm of breast 01/28/1999    L     HTN (hypertension)     On treatment     Left ankle injury 11/2010    Twisted ankle (resolved). 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 Probable drug induced     Nasal polyps      " Obesity, unspecified     minimal     Pleural effusions 1999    At initial dx of RA. Not worse. SOB with stairs. Cardiac work-up negative. Past told was from scar tissure in lungs 1999.      Pneumonia 1999     Post term pregnancy, delivered with or without mention of antepartum condition     childbirth x 2     Premenopausal patient     Hot flashes.      Pure hypercholesterolemia     elevated 1998     RA (rheumatoid arthritis) (H) 1999    +anti-CCP 60 5/2012; 3/2008 x-rays no erosions hands/feet       PAST SURGICAL HISTORY:  Past Surgical History:   Procedure Laterality Date     C STEREOTACTIC BREAST BIOPSY  01/28/1999    L     HC EXC BENIGN SKIN LESION TRUNK/ARM/LEG 0.6-1.0 CM  01/28/1999    R axilla     HC EXCISION BREAST LESION, OPEN >=1  01/28/1999    L     HC PLACE NEEDLE WIRE PREOP, BREAST, INITIAL  01/28/1999    L     RW GENERAL SURG (ABSTRACTED)  [id number]    right knee surgery ,knee cap     RW OTHER (ABSTRACTED)  2001    sinus surgery,polyps removed       FAMILY HISTORY:  Family History   Problem Relation Age of Onset     CANCER Maternal Grandmother      Brain, lung     HEART DISEASE Paternal Grandmother      MI, asthma     Other - See Comments Brother      brother and niece with celiac     DIABETES No family hx of      10 brothers and sisters        SOCIAL HISTORY:  Social History     Social History     Marital status:      Spouse name: N/A     Number of children: N/A     Years of education: N/A     Social History Main Topics     Smoking status: Never Smoker     Smokeless tobacco: Never Used     Alcohol use No      Comment: rare     Drug use: No     Sexual activity: Not Asked     Other Topics Concern     Blood Transfusions No     Sleep Concern No     Stress Concern No     Weight Concern Yes     Special Diet No     Back Care No     Exercise Yes     Seat Belt Yes     Self-Exams Yes     Social History Narrative    Works in airline       Review of Systems:  Skin:  Negative       Eyes:  Positive for  "glasses    ENT:  Negative      Respiratory:  Positive for shortness of breath     Cardiovascular:    dizziness;Positive for    Gastroenterology: Positive for heartburn    Genitourinary:  Negative      Musculoskeletal:  Positive for arthritis    Neurologic:  Positive for headaches    Psychiatric:  Positive for sleep disturbances    Heme/Lymph/Imm:  Negative      Endocrine:  Negative        Physical Exam:  Vitals: /80 (BP Location: Right arm, Patient Position: Sitting, Cuff Size: Adult Regular)  Pulse 68  Ht 1.651 m (5' 5\")  Wt 96.2 kg (212 lb)  LMP 03/02/2002  Breastfeeding? No  BMI 35.28 kg/m2    Constitutional:  cooperative;in no acute distress        Skin:  warm and dry to the touch          Head:  normocephalic        Eyes:  sclera white        Lymph:No Cervical lymphadenopathy present     ENT:  no pallor or cyanosis        Neck:  no stiffness        Respiratory:  clear to auscultation         Cardiac: regular rhythm;normal S1 and S2                pulses full and equal                                        GI:  abdomen soft        Extremities and Muscular Skeletal:  no edema              Neurological:  affect appropriate        Psych:  affect appropriate, oriented to time, person and place        CC  Christopher Osei MD  [id number]  [place] [id number]              "

## 2018-06-04 NOTE — MR AVS SNAPSHOT
After Visit Summary   6/4/2018    Jodie Dailey    MRN: [id number]           Patient Information     Date Of Birth          1965        Visit Information        Provider Department      6/4/2018 3:45 PM Christopher Osei MD [facility]        Today's Diagnoses     Atypical chest pain        Benign essential hypertension        Rheumatoid arthritis involving multiple sites with positive rheumatoid factor (H)           Follow-ups after your visit        Your next 10 appointments already scheduled     Oct 02, 2018  3:00 PM CDT   (Arrive by 2:45 PM)   Return Visit with Ubaldo Montoya MD   [facility] Rheumatology ([facility])    [address] [place] [address]  [address]  [facility] [address] [phone]            Apr 03, 2019  3:00 PM CDT   (Arrive by 2:45 PM)   Return Visit with QI Narayanan [facility] Rheumatology [facility])    [address] [place] [address]  [address]  [facility] [address] [phone]              Who to contact     If you have questions or need follow up information about today's clinic visit or your schedule please contact [facility] directly at [phone].  Normal or non-critical lab and imaging results will be communicated to you by MyChart, letter or phone within 4 business days after the clinic has received the results. If you do not hear from us within 7 days, please contact the clinic through MyChart or phone. If you have a critical or abnormal lab result, we will notify you by phone as soon as possible.  Submit refill requests through [employer] or call your pharmacy and they will forward the refill request to us. Please allow 3 business days for your refill to be completed.          Additional Information About Your Visit        MyChart Information     [employer] gives you secure access to your electronic health record. If you see  "a primary care provider, you can also send messages to your care team and make appointments. If you have questions, please call your primary care clinic.  If you do not have a primary care provider, please call [phone] and they will assist you.        Care EveryWhere ID     This is your Care EveryWhere ID. This could be used by other organizations to access your [place] medical records  [id number]        Your Vitals Were     Pulse Height Last Period Breastfeeding? BMI (Body Mass Index)       68 1.651 m (5' 5\") 03/02/2002 No 35.28 kg/m2        Blood Pressure from Last 3 Encounters:   06/04/18 130/80   04/18/18 142/80   04/05/18 120/78    Weight from Last 3 Encounters:   06/04/18 96.2 kg (212 lb)   04/18/18 96 kg (211 lb 11.2 oz)   04/05/18 96 kg (211 lb 9.6 oz)              We Performed the Following     Follow-Up with Cardiologist        Primary Care Provider Office Phone # Fax #    Marga REARDON Yue [phone]       [address] [address]        Equal Access to Services     CALLI HENRY : Hadii aad ku hadasho Soomaali, waaxda luqadaha, qaybta kaalmada adeegyada, waxay rafain haybettyn lalita staples ah. So [facility] [phone].    ATENCIÓN: Si habla español, tiene a kate disposición servicios gratuitos de asistencia lingüística. Llame al [phone].    We comply with applicable federal civil rights laws and [place] laws. We do not discriminate on the basis of race, color, national origin, age, disability, sex, sexual orientation, or gender identity.            Thank you!     Thank you for choosing [facility]  for your care. Our goal is always to provide you with excellent care. Hearing back from our patients is one way we can continue to improve our services. Please take a few minutes to complete the written survey that you may receive in the mail after your visit with us. Thank you!             Your " "Updated Medication List - Protect others around you: Learn how to safely use, store and throw away your medicines at www.disposemymeds.org.          This list is accurate as of 6/4/18  3:55 PM.  Always use your most recent med list.                   Brand Name Dispense Instructions for use Diagnosis    azaTHIOprine 50 MG tablet    IMURAN    180 tablet    Take 2 tablets (100 mg) by mouth daily    Rheumatoid arthritis of multiple sites without rheumatoid factor (H)       B-6 PO      Take 1 tablet by mouth daily.        CALCIUM 500 + D PO      Take 1 tablet by mouth daily.        [id number]-D-OMEGA 3-PHYTOSTER PO           folic acid 1 MG tablet    FOLVITE    180 tablet    TAKE 1 TABLET BY MOUTH TWO  TIMES DAILY    Rheumatoid arthritis (H)       hydrochlorothiazide 25 MG tablet    HYDRODIURIL     Take 1 tablet by mouth daily.        methotrexate sodium (pres-free) 50 MG/2ML Soln injection CHEMO     24 mL    Give 0.9 mL (22.5 mg) subcutaneously every 7 days.    Rheumatoid arthritis of multiple sites without rheumatoid factor (H)       tuberculin-allergy syringes 27G X 1/2\" 1 ML [facility]    B-D TB SYRINGE    100 each    USE AS DIRECTED FOR WEEKLY INJECTIONS OF METHOTREXATE    Rheumatoid arthritis involving multiple sites with positive rheumatoid factor (H), History of basal cell carcinoma       valACYclovir 1000 mg tablet    VALTREX     Take 1 g by mouth        VITAMIN B12 PO      Take 1 tablet by mouth daily.          "

## 2018-06-04 NOTE — LETTER
"6/4/2018    Marga Carltonkirk  [facility] [address] [facility] [address]    RE: Jodie RONQUILLO Dailey       Dear Colleague,    I had the pleasure of seeing Jodie Dailey in the [facility].    HPI and Plan:   See dictation    No orders of the defined types were placed in this encounter.      No orders of the defined types were placed in this encounter.      There are no discontinued medications.      Encounter Diagnoses   Name Primary?     Atypical chest pain      Benign essential hypertension      Rheumatoid arthritis involving multiple sites with positive rheumatoid factor (H)        CURRENT MEDICATIONS:  Current Outpatient Prescriptions   Medication Sig Dispense Refill     azaTHIOprine (IMURAN) 50 MG tablet Take 2 tablets (100 mg) by mouth daily 180 tablet 0     Calcium Carbonate-Vitamin D (CALCIUM 500 + D PO) Take 1 tablet by mouth daily.       Cyanocobalamin (VITAMIN B12 PO) Take 1 tablet by mouth daily.       [id number]-[id number]-D-OMEGA 3-PHYTOSTER PO        folic acid (FOLVITE) 1 MG tablet TAKE 1 TABLET BY MOUTH TWO  TIMES DAILY 180 tablet 1     hydrochlorothiazide (HYDRODIURIL) 25 MG tablet Take 1 tablet by mouth daily.       methotrexate sodium, pres-free, 50 MG/2ML SOLN injection CHEMO Give 0.9 mL (22.5 mg) subcutaneously every 7 days. 24 mL 0     Pyridoxine HCl (B-6 PO) Take 1 tablet by mouth daily.       tuberculin-allergy syringes (B-D TB SYRINGE) 27G X 1/2\" 1 ML MISC USE AS DIRECTED FOR WEEKLY INJECTIONS OF METHOTREXATE 100 each 0     valACYclovir (VALTREX) 1000 mg tablet Take 1 g by mouth         ALLERGIES     Allergies   Allergen Reactions     No Known Allergies      Seasonal Allergies      Ragweed, dust mites       PAST MEDICAL HISTORY:  Past Medical History:   Diagnosis Date     Anemia     History.      Basal cell cancer 3/2014     Benign neoplasm of breast 01/28/1999    L     HTN (hypertension)     On treatment     Left ankle injury 11/2010    Twisted ankle " (resolved). 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 Probable drug induced     Nasal polyps      Obesity, unspecified     minimal     Pleural effusions 1999    At initial dx of RA. Not worse. SOB with stairs. Cardiac work-up negative. Past told was from scar tissure in lungs 1999.      Pneumonia 1999     Post term pregnancy, delivered with or without mention of antepartum condition     childbirth x 2     Premenopausal patient     Hot flashes.      Pure hypercholesterolemia     elevated 1998     RA (rheumatoid arthritis) (H) 1999    +anti-CCP 60 5/2012; 3/2008 x-rays no erosions hands/feet       PAST SURGICAL HISTORY:  Past Surgical History:   Procedure Laterality Date     C STEREOTACTIC BREAST BIOPSY  01/28/1999    L     HC EXC BENIGN SKIN LESION TRUNK/ARM/LEG 0.6-1.0 CM  01/28/1999    R axilla     HC EXCISION BREAST LESION, OPEN >=1  01/28/1999    L     HC PLACE NEEDLE WIRE PREOP, BREAST, INITIAL  01/28/1999    L     RW GENERAL SURG (ABSTRACTED)  0643-2434    right knee surgery ,knee cap     RW OTHER (ABSTRACTED)  2001    sinus surgery,polyps removed       FAMILY HISTORY:  Family History   Problem Relation Age of Onset     CANCER Maternal Grandmother      Brain, lung     HEART DISEASE Paternal Grandmother      MI, asthma     Other - See Comments Brother      brother and niece with celiac     DIABETES No family hx of      10 brothers and sisters        SOCIAL HISTORY:  Social History     Social History     Marital status:      Spouse name: N/A     Number of children: N/A     Years of education: N/A     Social History Main Topics     Smoking status: Never Smoker     Smokeless tobacco: Never Used     Alcohol use No      Comment: rare     Drug use: No     Sexual activity: Not Asked     Other Topics Concern     Blood Transfusions No     Sleep Concern No     Stress Concern  "No     Weight Concern Yes     Special Diet No     Back Care No     Exercise Yes     Seat Belt Yes     Self-Exams Yes     Social History Narrative    Works in airline       Review of Systems:  Skin:  Negative       Eyes:  Positive for glasses    ENT:  Negative      Respiratory:  Positive for shortness of breath     Cardiovascular:    dizziness;Positive for    Gastroenterology: Positive for heartburn    Genitourinary:  Negative      Musculoskeletal:  Positive for arthritis    Neurologic:  Positive for headaches    Psychiatric:  Positive for sleep disturbances    Heme/Lymph/Imm:  Negative      Endocrine:  Negative        Physical Exam:  Vitals: /80 (BP Location: Right arm, Patient Position: Sitting, Cuff Size: Adult Regular)  Pulse 68  Ht 1.651 m (5' 5\")  Wt 96.2 kg (212 lb)  LMP 03/02/2002  Breastfeeding? No  BMI 35.28 kg/m2    Constitutional:  cooperative;in no acute distress        Skin:  warm and dry to the touch          Head:  normocephalic        Eyes:  sclera white        Lymph:No Cervical lymphadenopathy present     ENT:  no pallor or cyanosis        Neck:  no stiffness        Respiratory:  clear to auscultation         Cardiac: regular rhythm;normal S1 and S2                pulses full and equal                                        GI:  abdomen soft        Extremities and Muscular Skeletal:  no edema              Neurological:  affect appropriate        Psych:  affect appropriate, oriented to time, person and place        CC  Christopher Osei MD  [address]  [place] [address]                Thank you for allowing me to participate in the care of your patient.      Sincerely,     Christopher Osei MD     [facility]    cc:   Christopher Osei MD  [address]  [place] [address]        "

## 2018-06-04 NOTE — LETTER
2018      Marga Carltonkirk  [facility] [address] [address]      RE: Rudy Lea       Dear Colleague,    I had the pleasure of seeing Rudy Lea in the [facility].    Service Date: 2018      HISTORY OF PRESENT ILLNESS:  Ms. Lea is a pleasant 52-year-old female with a longstanding history of rheumatoid arthritis, essential hypertension, and previous tobacco abuse who was referred to Cardiology because of atypical chest pain.  I had the patient undergo a stress echo which was normal.  I also had her undergo a calcium score to evaluate for nonobstructive coronary disease which has also luckily come back at 0.  Unfortunately, she was noted to have some mild emphysematous changes on the radiology portion of the study.      The patient continues to have occasional episodes of chest heaviness.  She wonders if this may be related to her lungs.      She denies any other cardiovascular complaints, denying any other types of chest pain, chest pressure, shortness of breath, orthopnea or paroxysmal nocturnal dyspnea.      Please see my separate note with her full physical examination.      IMPRESSION AND PLAN:  Ms. Lea is a pleasant 52-year-old with atypical chest discomfort and risk factors for coronary disease.  Fortunately, her calcium score was 0 and her stress test was also unremarkable.  She does have some mild emphysematous changes on her CT scan of her lungs, and I have asked her to follow back with her family doctor.  This may be the etiology of her chest discomfort.  On exam, however, I did not hear any expiratory or inspiratory wheezing.  The patient can follow back with Cardiology on a p.r.n. basis.         JAZMYNE ROCHA MD             D: 2018   T: 2018   MT: ANALISA      Name:     RUDY LEA   MRN:      [id number]        Account:      [id number]   :      1965           Service Date: 2018      Document:  "[id number]         Outpatient Encounter Prescriptions as of 6/4/2018   Medication Sig Dispense Refill     azaTHIOprine (IMURAN) 50 MG tablet Take 2 tablets (100 mg) by mouth daily 180 tablet 0     Calcium Carbonate-Vitamin D (CALCIUM 500 + D PO) Take 1 tablet by mouth daily.       Cyanocobalamin (VITAMIN B12 PO) Take 1 tablet by mouth daily.       [id number]-Y03-D-OMEGA 3-PHYTOSTER PO        folic acid (FOLVITE) 1 MG tablet TAKE 1 TABLET BY MOUTH TWO  TIMES DAILY 180 tablet 1     hydrochlorothiazide (HYDRODIURIL) 25 MG tablet Take 1 tablet by mouth daily.       methotrexate sodium, pres-free, 50 MG/2ML SOLN injection CHEMO Give 0.9 mL (22.5 mg) subcutaneously every 7 days. 24 mL 0     Pyridoxine HCl (B-6 PO) Take 1 tablet by mouth daily.       tuberculin-allergy syringes (B-D TB SYRINGE) 27G X 1/2\" 1 ML MISC USE AS DIRECTED FOR WEEKLY INJECTIONS OF METHOTREXATE 100 each 0     valACYclovir (VALTREX) 1000 mg tablet Take 1 g by mouth       No facility-administered encounter medications on file as of 6/4/2018.        Again, thank you for allowing me to participate in the care of your patient.      Sincerely,    Christopher Osei MD     [facility]    "

## 2018-06-05 NOTE — PROGRESS NOTES
Service Date: 2018      HISTORY OF PRESENT ILLNESS:  Ms. Lea is a pleasant 52-year-old female with a longstanding history of rheumatoid arthritis, essential hypertension, and previous tobacco abuse who was referred to Cardiology because of atypical chest pain.  I had the patient undergo a stress echo which was normal.  I also had her undergo a calcium score to evaluate for nonobstructive coronary disease which has also luckily come back at 0.  Unfortunately, she was noted to have some mild emphysematous changes on the radiology portion of the study.      The patient continues to have occasional episodes of chest heaviness.  She wonders if this may be related to her lungs.      She denies any other cardiovascular complaints, denying any other types of chest pain, chest pressure, shortness of breath, orthopnea or paroxysmal nocturnal dyspnea.      Please see my separate note with her full physical examination.      IMPRESSION AND PLAN:  Ms. Lea is a pleasant 52-year-old with atypical chest discomfort and risk factors for coronary disease.  Fortunately, her calcium score was 0 and her stress test was also unremarkable.  She does have some mild emphysematous changes on her CT scan of her lungs, and I have asked her to follow back with her family doctor.  This may be the etiology of her chest discomfort.  On exam, however, I did not hear any expiratory or inspiratory wheezing.  The patient can follow back with Cardiology on a p.r.n. basis.         JAZMYNE ROCHA MD             D: 2018   T: 2018   MT: ANALISA      Name:     RUDY LEA   MRN:      [id number]        Account:      [id number]   :      1965           Service Date: 2018      Document: [id number]

## 2018-06-22 DIAGNOSIS — M06.09 RHEUMATOID ARTHRITIS OF MULTIPLE SITES WITHOUT RHEUMATOID FACTOR (H): ICD-10-CM

## 2018-06-25 LAB
ALBUMIN SERPL-MCNC: 4.1 G/DL (ref 3.5–5.2)
ALT SERPL-CCNC: 18 U/L (ref 8–45)
AST SERPL-CCNC: 31 U/L (ref 2–40)
CREAT SERPL-MCNC: 0.76 MG/DL (ref 0.57–1.11)
CRP SERPL-MCNC: 0.3 MG/L
ERYTHROCYTE [DISTWIDTH] IN BLOOD BY AUTOMATED COUNT: 13.1 % (ref 11.5–15.5)
ERYTHROCYTE [SEDIMENTATION RATE] IN BLOOD: 22 MM/HR
GFR SERPL CREATININE-BSD FRML MDRD: >60 ML/MIN/1.73M2
HCT VFR BLD AUTO: 40.7 % (ref 33–51)
HEMOGLOBIN: 13.6 G/DL (ref 12–16)
MCH RBC QN AUTO: 30.9 PG (ref 26–34)
MCHC RBC AUTO-ENTMCNC: 33.4 G/DL (ref 32–36)
MCV RBC AUTO: 93 FL (ref 80–100)
PLATELET # BLD AUTO: 155 10^9/L (ref 140–440)
RBC # BLD AUTO: 4.4 10^12/L (ref 4–5.2)
WBC # BLD AUTO: 5.3 10^9/L (ref 4.5–11)

## 2018-06-25 RX ORDER — AZATHIOPRINE 50 MG/1
100 TABLET ORAL DAILY
Qty: 180 TABLET | Refills: 0 | Status: SHIPPED | OUTPATIENT
Start: 2018-06-25 | End: 2018-08-01

## 2018-06-25 NOTE — PROGRESS NOTES
Outside labs abstracted from [facility].   Jessika Lockwood [facility]  6/25/2018 10:04 AM

## 2018-06-25 NOTE — TELEPHONE ENCOUNTER
azaTHIOprine (IMURAN) 50 MG    Last Written Prescription Date:  3/5/18  Last Fill Quantity: 180,   # refills: 0  Last Office Visit: 4/5/18  Future Office visit:  10/2/18    CBC RESULTS:   Recent Labs   Lab Test  06/13/18   1609   WBC  5.3   RBC  4.40   HGB  13.6   HCT  40.7   MCV  93   MCH  30.9   MCHC  33.4   RDW  13.1   PLT  155       Creatinine   Date Value Ref Range Status   06/13/2018 0.76 0.57 - 1.11 mg/dL Final   ]    Liver Function Studies -   Recent Labs   Lab Test  06/13/18   1609  03/02/12  10/20/11   1635   PROTTOTAL   --    --    --   7.0   ALBUMIN  4.1   < >  3.4*  3.9   BILITOTAL   --    --    --   1.0   ALKPHOS   --    --   87  79   AST  31   < >  19  33   ALT  18   < >  31  10    < > = values in this interval not displayed.       Routing refill request to provider for review/approval because:  DMARD

## 2018-08-01 DIAGNOSIS — M06.09 RHEUMATOID ARTHRITIS OF MULTIPLE SITES WITHOUT RHEUMATOID FACTOR (H): ICD-10-CM

## 2018-08-02 RX ORDER — AZATHIOPRINE 50 MG/1
100 TABLET ORAL DAILY
Qty: 180 TABLET | Refills: 0 | Status: SHIPPED | OUTPATIENT
Start: 2018-08-02 | End: 2018-10-25

## 2018-08-02 NOTE — TELEPHONE ENCOUNTER
azaTHIOprine (IMURAN) 50 MG tablet  Last Written Prescription Date:  6/25/18  Last Fill Quantity: 180,   # refills: 0  Last Office Visit: 4/5/18  Future Office visit:  10/2/18    CBC RESULTS:   Recent Labs   Lab Test  06/13/18   1609   WBC  5.3   RBC  4.40   HGB  13.6   HCT  40.7   MCV  93   MCH  30.9   MCHC  33.4   RDW  13.1   PLT  155       Creatinine   Date Value Ref Range Status   06/13/2018 0.76 0.57 - 1.11 mg/dL Final   ]    Liver Function Studies -   Recent Labs   Lab Test  06/13/18   1609  03/02/12  10/20/11   1635   PROTTOTAL   --    --    --   7.0   ALBUMIN  4.1   < >  3.4*  3.9   BILITOTAL   --    --    --   1.0   ALKPHOS   --    --   87  79   AST  31   < >  19  33   ALT  18   < >  31  10    < > = values in this interval not displayed.     Lab Results   Component Value Date    ALBUMIN 4.1 06/13/2018       Routing refill request to provider for review/approval because:  protocol

## 2018-10-11 LAB
ABSOLUTE BASOPHILS (EXTERNAL): 0
ALBUMIN SERPL-MCNC: 4 G/DL (ref 3.5–5.2)
ALT SERPL-CCNC: 16 U/L (ref 8–45)
AST SERPL-CCNC: 29 U/L (ref 2–40)
BASOPHILS NFR BLD AUTO: 0.4 %
CREAT SERPL-MCNC: 0.8 MG/DL (ref 0.57–1.11)
CRP INFLAMMATION (EXTERNAL): 0.4
EOSINOPHIL # BLD AUTO: 0.2 10*3/UL
EOSINOPHIL NFR BLD AUTO: 4.9 %
ERYTHROCYTE [DISTWIDTH] IN BLOOD BY AUTOMATED COUNT: 13.3 % (ref 11.5–15.5)
ERYTHROCYTE [SEDIMENTATION RATE] IN BLOOD: 27 MM/HR
GFR SERPL CREATININE-BSD FRML MDRD: >60 ML/MIN/1.73M2
HCT VFR BLD AUTO: 41.7 % (ref 33–51)
HEMOGLOBIN: 14.2 G/DL (ref 12–16)
LYMPHOCYTES # BLD AUTO: 0.8 10*3/UL (ref 0.9–2.9)
LYMPHOCYTES NFR BLD AUTO: 17.6 %
MCH RBC QN AUTO: 31.1 PG (ref 26–34)
MCHC RBC AUTO-ENTMCNC: 34.1 G/DL (ref 32–36)
MCV RBC AUTO: 91 FL (ref 80–100)
MONOCYTES # BLD AUTO: 0.4 10*3/UL
MONOCYTES NFR BLD AUTO: 8.1 %
NEUTROPHILS # BLD AUTO: 3.3 10*3/UL (ref 1.7–7)
NEUTROPHILS NFR BLD AUTO: 69 %
PLATELET COUNT - QUEST: 174 10^9/L (ref 140–440)
RBC # BLD AUTO: 4.56 10^12/L (ref 4–5.2)
WBC # BLD AUTO: 4.7 10^9/L (ref 4.5–11)

## 2018-10-11 NOTE — PROGRESS NOTES
The blood counts, liver, kidney and CRP inflammation labs are normal.     Lj BUSBY, CNP, MSN  10/11/2018  11:51 AM

## 2018-10-23 DIAGNOSIS — M06.9 RHEUMATOID ARTHRITIS (H): ICD-10-CM

## 2018-10-25 DIAGNOSIS — M06.09 RHEUMATOID ARTHRITIS OF MULTIPLE SITES WITHOUT RHEUMATOID FACTOR (H): ICD-10-CM

## 2018-10-25 RX ORDER — FOLIC ACID 1 MG/1
1 TABLET ORAL 2 TIMES DAILY
Qty: 180 TABLET | Refills: 3 | Status: SHIPPED | OUTPATIENT
Start: 2018-10-25 | End: 2019-05-09

## 2018-10-26 RX ORDER — AZATHIOPRINE 50 MG/1
TABLET ORAL
Qty: 180 TABLET | Refills: 0 | Status: SHIPPED | OUTPATIENT
Start: 2018-10-26 | End: 2019-05-09

## 2018-10-26 NOTE — TELEPHONE ENCOUNTER
azaTHIOprine (IMURAN) 50 MG tablet      Last Written Prescription Date:  8-2-18  Last Fill Quantity: 180,   # refills: 0  Last Office Visit: 4-5-18  Future Office visit:  4-3-19    CBC RESULTS:   Recent Labs   Lab Test  10/08/18   1522   WBC  4.7   RBC  4.56   HGB  14.2   HCT  41.7   MCV  91   MCH  31.1   MCHC  34.1   RDW  13.3   PLT  174       Creatinine   Date Value Ref Range Status   10/08/2018 0.80 0.57 - 1.11 mg/dL Final   ]    Liver Function Studies -   Recent Labs   Lab Test  10/08/18   1522  03/02/12  10/20/11   1635   PROTTOTAL   --    --    --   7.0   ALBUMIN  4.0   < >  3.4*  3.9   BILITOTAL   --    --    --   1.0   ALKPHOS   --    --   87  79   AST  29   < >  19  33   ALT  16   < >  31  10    < > = values in this interval not displayed.       Routing refill request to provider for review/approval because:  Per protocol

## 2019-01-02 DIAGNOSIS — M06.09 RHEUMATOID ARTHRITIS OF MULTIPLE SITES WITHOUT RHEUMATOID FACTOR (H): ICD-10-CM

## 2019-01-06 NOTE — TELEPHONE ENCOUNTER
methotrexate sodium, pres-free, 50 MG/2ML SOLN injection CHEMO  Last Written Prescription Date:  4/5/18  Last Fill Quantity: 24ml,   # refills: 0  Last Office Visit : 10/2/18  Future Office visit:  4/3/19    CBC RESULTS:   Recent Labs   Lab Test 10/08/18  1522   WBC 4.7   RBC 4.56   HGB 14.2   HCT 41.7   MCV 91   MCH 31.1   MCHC 34.1   RDW 13.3          Creatinine   Date Value Ref Range Status   10/08/2018 0.80 0.57 - 1.11 mg/dL Final   ]    Liver Function Studies -   Recent Labs   Lab Test 10/08/18  1522  03/02/12 10/20/11  1635   PROTTOTAL  --   --   --  7.0   ALBUMIN 4.0   < > 3.4* 3.9   BILITOTAL  --   --   --  1.0   ALKPHOS  --   --  87 79   AST 29   < > 19 33   ALT 16   < > 31 10    < > = values in this interval not displayed.     Lab Results   Component Value Date    ALBUMIN 4.0 10/08/2018       Routing refill request to provider for review/approval because:  protocol

## 2019-01-07 ENCOUNTER — MYC REFILL (OUTPATIENT)
Dept: RHEUMATOLOGY | Facility: CLINIC | Age: 54
End: 2019-01-07

## 2019-01-07 DIAGNOSIS — M06.09 RHEUMATOID ARTHRITIS OF MULTIPLE SITES WITHOUT RHEUMATOID FACTOR (H): ICD-10-CM

## 2019-01-07 RX ORDER — METHOTREXATE 25 MG/ML
INJECTION INTRA-ARTERIAL; INTRAMUSCULAR; INTRATHECAL; INTRAVENOUS
Qty: 24 ML | Refills: 0 | Status: SHIPPED | OUTPATIENT
Start: 2019-01-07 | End: 2019-01-07

## 2019-01-08 RX ORDER — METHOTREXATE 25 MG/ML
INJECTION INTRA-ARTERIAL; INTRAMUSCULAR; INTRATHECAL; INTRAVENOUS
Qty: 24 ML | Refills: 0 | Status: SHIPPED | OUTPATIENT
Start: 2019-01-08 | End: 2019-05-09

## 2019-01-25 ENCOUNTER — DOCUMENTATION ONLY (OUTPATIENT)
Dept: RHEUMATOLOGY | Facility: CLINIC | Age: 54
End: 2019-01-25

## 2019-01-25 LAB
ABSOLUTE BASOPHILS (EXTERNAL): 0
ALBUMIN SERPL-MCNC: 3.9 G/DL (ref 3.5–5.2)
ALT SERPL-CCNC: 19 U/L (ref 8–45)
AST SERPL-CCNC: 27 U/L (ref 2–40)
BASOPHILS NFR BLD AUTO: 0.4 %
CREAT SERPL-MCNC: 0.76 MG/DL (ref 0.57–1.11)
CRP SERPL-MCNC: 0.31 MG/DL
EOSINOPHIL # BLD AUTO: 0.2 10*3/UL
EOSINOPHIL NFR BLD AUTO: 3.3 %
ERYTHROCYTE [DISTWIDTH] IN BLOOD BY AUTOMATED COUNT: 13 % (ref 11.5–15.5)
ERYTHROCYTE [SEDIMENTATION RATE] IN BLOOD: 31 MM/HR
GFR SERPL CREATININE-BSD FRML MDRD: >60 ML/MIN/1.73M2
HCT VFR BLD AUTO: 41.2 % (ref 33–51)
HEMOGLOBIN: 14.1 G/DL (ref 12–16)
LYMPHOCYTES # BLD AUTO: 0.6 10*3/UL (ref 0.9–2.9)
LYMPHOCYTES NFR BLD AUTO: 13.6 %
MCH RBC QN AUTO: 31.3 PG (ref 26–34)
MCHC RBC AUTO-ENTMCNC: 34.2 G/DL (ref 32–36)
MCV RBC AUTO: 91 FL (ref 80–100)
MONOCYTES # BLD AUTO: 0.3 10*3/UL
MONOCYTES NFR BLD AUTO: 6.5 %
NEUTROPHILS # BLD AUTO: 3.4 10*3/UL (ref 1.7–7)
NEUTROPHILS NFR BLD AUTO: 76.2 %
PLATELET COUNT - QUEST: 165 10^9/L (ref 140–440)
PMV BLD: 11 FL (ref 6.5–11)
RBC # BLD AUTO: 4.51 10^12/L (ref 4–5.2)
WBC # BLD AUTO: 4.5 10^9/L (ref 4.5–11)

## 2019-01-31 ENCOUNTER — MYC MEDICAL ADVICE (OUTPATIENT)
Dept: RHEUMATOLOGY | Facility: CLINIC | Age: 54
End: 2019-01-31

## 2019-02-11 NOTE — TELEPHONE ENCOUNTER
Prior Authorization Retail Medication Request    Medication/Dose: Methotrexate   ICD code (if different than what is on RX):  M06.09  Previously Tried and Failed:  NA  Rationale: Patient stable on medication and dosing for 5+ years.     Insurance Name:    Insurance ID:        Pharmacy Information (if different than what is on RX)  Name:  [facility]  Phone:

## 2019-02-14 NOTE — TELEPHONE ENCOUNTER
Please have Samuel or Sharmila look at this.   She needs to schedule an appt with Dr. Jan BUSBY, CNP, MSN

## 2019-02-14 NOTE — TELEPHONE ENCOUNTER
Roseann did review pt's insurance for us and it was determined that we could apply for a PA for Rasuvo.     Left message on pt's voice mail asking that she return call to discuss this as an option.    PRAKASH OchoaN RN  Rheumatology RN Coordinator  [facility]

## 2019-02-21 ENCOUNTER — DOCUMENTATION ONLY (OUTPATIENT)
Dept: CARE COORDINATION | Facility: CLINIC | Age: 54
End: 2019-02-21

## 2019-02-25 NOTE — TELEPHONE ENCOUNTER
Contacted pt's insurance to inquire as to what the covered alternatives are to methotrexate. Was told that the plan will cover tabs, but not any of the injectables. Left message on pt's voice mail asking that she return call. We will need to discuss this and the option of [facility] for the injectable form of methotrexate.    PRAKASH OchoaN RN  Rheumatology RN Coordinator  [facility]

## 2019-03-29 ENCOUNTER — TELEPHONE (OUTPATIENT)
Dept: RHEUMATOLOGY | Facility: CLINIC | Age: 54
End: 2019-03-29

## 2019-03-29 DIAGNOSIS — Z79.899 HIGH RISK MEDICATION USE: ICD-10-CM

## 2019-03-29 DIAGNOSIS — R76.8 POSITIVE ANTI-CCP TEST: ICD-10-CM

## 2019-03-29 DIAGNOSIS — M05.79 RHEUMATOID ARTHRITIS INVOLVING MULTIPLE SITES WITH POSITIVE RHEUMATOID FACTOR (H): Primary | ICD-10-CM

## 2019-03-29 NOTE — TELEPHONE ENCOUNTER
[facility]    Phone Message    May a detailed message be left on voicemail: yes    Reason for Call: Other: Maria A [facility], [phone], Calling because we requesting some results, but they haven't seen Pt since Dec. Maria A states that they sent over Dec Results on Jan 02nd 2019. Please call and discuss.     Action Taken: Message routed to:  [facility] Rheum

## 2019-03-29 NOTE — LETTER
Date: 2019    Jodie Dailey  [address]  [place] [address]      MRN: [id number]  : 1965    Dx:    ICD-10-CM    1. Rheumatoid arthritis involving multiple sites with positive rheumatoid factor (H) M05.79 CBC with platelets differential     Creatinine     Albumin level     ALT     AST     CRP inflammation     Erythrocyte sedimentation rate auto   2. Positive anti-CCP test R76.8 CBC with platelets differential     Creatinine     Albumin level     ALT     AST     CRP inflammation     Erythrocyte sedimentation rate auto   3. High risk medication use Z79.899 CBC with platelets differential     Creatinine     Albumin level     ALT     AST     CRP inflammation     Erythrocyte sedimentation rate auto       Orders Placed This Encounter     CBC with platelets differential     Standing Status:   Standing     Number of Occurrences:   6     Standing Expiration Date:   2020     Scheduling Instructions:      Expected date:  2019     Creatinine     Standing Status:   Standing     Number of Occurrences:   6     Standing Expiration Date:   2020     Albumin level     Standing Status:   Standing     Number of Occurrences:   6     Standing Expiration Date:   2020     ALT     Standing Status:   Standing     Number of Occurrences:   6     Standing Expiration Date:   2020     AST     Standing Status:   Standing     Number of Occurrences:   6     Standing Expiration Date:   2020     CRP inflammation     Standing Status:   Standing     Number of Occurrences:   6     Standing Expiration Date:   2020     Erythrocyte sedimentation rate auto     Standing Status:   Standing     Number of Occurrences:   6     Standing Expiration Date:   2020       Fax results to [phone]    Sincerely,    Lj Robledo,APRN, CNP, MSN  Division of Rheumatic and Autoimmune Diseases  [address] [place] [address] [id number]  [place] [address]

## 2019-04-02 NOTE — TELEPHONE ENCOUNTER
[facility] Call Center    Phone Message    May a detailed message be left on voicemail: yes    Reason for Call: Other: [facility]:  Mayra from [facility] calling asking for Labs orders today. Per Mayra pt is in Clinic and is having labs done now, needing new orders and Dr. Montoya's electronic or hand signature. Further questions please call [phone] or fax new orders to [phone] thanks. - Caller asked for Writer to send message High Priority      Action Taken: Message routed to:  [facility] RHEUM

## 2019-04-02 NOTE — TELEPHONE ENCOUNTER
Returned call to Mayra in the [facility] [place]. They have outdated lab orders. Pt is scheduled to see Lj on 4/11/19. The labs will be reordered and will ask Dr Montoya to sign as Lj BUSBY CNP is out of the clinic today.    After signed will need to fax to above lab at [phone] as they requested.    PRAKASH OchoaN RN  Rheumatology RN Coordinator  [facility]

## 2019-04-04 LAB
ABSOLUTE BASOPHILS (EXTERNAL): 0
ALBUMIN SERPL-MCNC: 3.9 G/DL (ref 3.5–5.2)
ALT SERPL-CCNC: 17 U/L (ref 8–45)
AST SERPL-CCNC: 25 U/L (ref 2–40)
BASOPHILS NFR BLD AUTO: 0.5 %
CREAT SERPL-MCNC: 0.78 MG/DL (ref 0.57–1.11)
CRP SERPL-MCNC: 0.42 MG/L
EOSINOPHIL # BLD AUTO: 0.3 10*3/UL
EOSINOPHIL NFR BLD AUTO: 6.1 %
ERYTHROCYTE [DISTWIDTH] IN BLOOD BY AUTOMATED COUNT: 13.1 % (ref 11.5–15.5)
ERYTHROCYTE [SEDIMENTATION RATE] IN BLOOD: 32 MM/HR
GFR SERPL CREATININE-BSD FRML MDRD: >60 ML/MIN/1.73M2
HCT VFR BLD AUTO: 40.4 % (ref 33–51)
HEMOGLOBIN: 13.7 G/DL (ref 12–16)
LYMPHOCYTES # BLD AUTO: 0.8 10*3/UL (ref 0.9–2.9)
LYMPHOCYTES NFR BLD AUTO: 18.1 %
MCH RBC QN AUTO: 30.8 PG (ref 26–34)
MCHC RBC AUTO-ENTMCNC: 33.9 G/DL (ref 32–36)
MCV RBC AUTO: 91 FL (ref 80–100)
MONOCYTES # BLD AUTO: 0.3 10*3/UL
MONOCYTES NFR BLD AUTO: 7.5 %
NEUTROPHILS # BLD AUTO: 3 10*3/UL (ref 1.7–7)
NEUTROPHILS NFR BLD AUTO: 67.8 %
PLATELET COUNT - QUEST: 154 10^9/L (ref 140–440)
RBC # BLD AUTO: 4.45 10^12/L (ref 4–5.2)
WBC # BLD AUTO: 4.4 10^9/L (ref 4.5–11)

## 2019-05-09 ENCOUNTER — ANCILLARY PROCEDURE (OUTPATIENT)
Dept: GENERAL RADIOLOGY | Facility: CLINIC | Age: 54
End: 2019-05-09
Attending: NURSE PRACTITIONER
Payer: COMMERCIAL

## 2019-05-09 ENCOUNTER — OFFICE VISIT (OUTPATIENT)
Dept: RHEUMATOLOGY | Facility: CLINIC | Age: 54
End: 2019-05-09
Attending: NURSE PRACTITIONER
Payer: COMMERCIAL

## 2019-05-09 ENCOUNTER — TELEPHONE (OUTPATIENT)
Dept: RHEUMATOLOGY | Facility: CLINIC | Age: 54
End: 2019-05-09

## 2019-05-09 VITALS
TEMPERATURE: 98.8 F | HEART RATE: 80 BPM | DIASTOLIC BLOOD PRESSURE: 91 MMHG | SYSTOLIC BLOOD PRESSURE: 154 MMHG | BODY MASS INDEX: 37.8 KG/M2 | WEIGHT: 221.4 LBS | HEIGHT: 64 IN

## 2019-05-09 DIAGNOSIS — R76.8 POSITIVE ANTI-CCP TEST: ICD-10-CM

## 2019-05-09 DIAGNOSIS — M05.79 RHEUMATOID ARTHRITIS INVOLVING MULTIPLE SITES WITH POSITIVE RHEUMATOID FACTOR (H): ICD-10-CM

## 2019-05-09 DIAGNOSIS — Z86.79 HISTORY OF VASCULITIS: ICD-10-CM

## 2019-05-09 DIAGNOSIS — M79.671 RIGHT FOOT PAIN: ICD-10-CM

## 2019-05-09 DIAGNOSIS — M06.09 RHEUMATOID ARTHRITIS OF MULTIPLE SITES WITHOUT RHEUMATOID FACTOR (H): ICD-10-CM

## 2019-05-09 DIAGNOSIS — Z79.899 HIGH RISK MEDICATION USE: ICD-10-CM

## 2019-05-09 DIAGNOSIS — M05.79 RHEUMATOID ARTHRITIS INVOLVING MULTIPLE SITES WITH POSITIVE RHEUMATOID FACTOR (H): Primary | ICD-10-CM

## 2019-05-09 PROCEDURE — 86235 NUCLEAR ANTIGEN ANTIBODY: CPT | Performed by: NURSE PRACTITIONER

## 2019-05-09 PROCEDURE — 86431 RHEUMATOID FACTOR QUANT: CPT | Performed by: NURSE PRACTITIONER

## 2019-05-09 PROCEDURE — 86481 TB AG RESPONSE T-CELL SUSP: CPT | Performed by: NURSE PRACTITIONER

## 2019-05-09 PROCEDURE — 86200 CCP ANTIBODY: CPT | Performed by: NURSE PRACTITIONER

## 2019-05-09 PROCEDURE — 86803 HEPATITIS C AB TEST: CPT | Performed by: NURSE PRACTITIONER

## 2019-05-09 PROCEDURE — 87340 HEPATITIS B SURFACE AG IA: CPT | Performed by: NURSE PRACTITIONER

## 2019-05-09 PROCEDURE — 86038 ANTINUCLEAR ANTIBODIES: CPT | Performed by: NURSE PRACTITIONER

## 2019-05-09 PROCEDURE — 86704 HEP B CORE ANTIBODY TOTAL: CPT | Performed by: NURSE PRACTITIONER

## 2019-05-09 PROCEDURE — G0463 HOSPITAL OUTPT CLINIC VISIT: HCPCS | Mod: ZF

## 2019-05-09 PROCEDURE — 36415 COLL VENOUS BLD VENIPUNCTURE: CPT | Performed by: NURSE PRACTITIONER

## 2019-05-09 RX ORDER — AZATHIOPRINE 50 MG/1
100 TABLET ORAL DAILY
Qty: 180 TABLET | Refills: 0 | Status: SHIPPED | OUTPATIENT
Start: 2019-05-09 | End: 2019-07-01

## 2019-05-09 ASSESSMENT — PAIN SCALES - GENERAL: PAINLEVEL: SEVERE PAIN (6)

## 2019-05-09 ASSESSMENT — MIFFLIN-ST. JEOR: SCORE: 1594.26

## 2019-05-09 NOTE — LETTER
5/9/2019      RE: Jodie Dailey  [address]  [place] [address]       [facility] - Rheumatology Clinic Visit    Jodie Dailey  is a 53 year old with sero-positive RA (Neg SUSHIL, no erosions on xray 3/2014, -RF/ low positive 60 CCP in 5/2012). XR 3-2014 no erosions hands or feet. Sees. Dr. Montoya     Past: methotrexate 17.5mg week, azothioprine 100mg day [decreased 10/2011 from 150mg day] folic acid, adalimumab (humira) leukoclastic vasculitis, oral methotrexate (hairloss)       May 9, 2019  No show to appt with Dr. Montoya   Cardiology appt 6-2018, atypical chest pain and hypertension.    Continues imuran 100 mg once a day. Tolerating. Labs 4-2019 only mild leukopenia. Reports since off this her arthritis is flaring. Right hand into fingers swelling with pain in tendons and joints, MCPs, and left hands. Right 5th MTP area of the old bunion surgery feels like it is broke, states had x-rays with [facility] a few years ago. Some pain over right 1st MTP underneath in the area of the pin. Left 2nd PIP swelling with pain. Thought felt lungs last week briefly. In menopause, some night sweats. Sees her dermatologist consultants once a year. No vasculitis since she was on adalimumab (humira) (legs many years ago). Denies any fever, chills, SOB, LARIOS, night sweats, or chest pain, or cough. Reports healthy. Prior to this even when methotrexate not covered, she did not feel her arthritis was controlled. Knees are still bad, does go to [facility]. History of basal skin cancer on thigh and face. Gelling of the knees. No infections in a long time    Off x 2 weeks methotrexate  0.9 ML or 22.5 mg once week SQ SUN injections, FA 1 mg day --insurance does not cover this. Not able to tolerate oral methotrexate.     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  No MS or cancer in family  Mother-lymes, macular degeneration   Father-healthy  Brother-celiac  Niece-celiac   PGM/PGF-heart disease  MGM-cancer tumor lung      SH:  No Alcohol. NO Smoking. No IVDU. Children. Right handed.        PMSH personally reviewed and updated by me.    ROS:  Negative raynaud s phenomena, hairloss, sun sensitivities, keratoconjunctivitis sicca, pleurisy, inflammatory joint symptoms, significant rashes like malar, oral/nasal or ulcerations, inflammatory eye disease, inflammatory bowel disease, dactylitis, tenosynovitis, or enthespathy. No temporal headache, no jaw claudication, no scalp tenderness, vision changes, carotidynia, cough  +see above   CONSTITUTIONAL: No fevers or unintentional weight change. No acute distress, swollen glands  EYES: No vision change, diplopia, pain in eyes or red eyes   EARS, NOSE, MOUTH, THROAT: No tinnitus or hearing change, no epistaxis or nasal discharge, no oral lesions, throat clear. Normal saliva pool.  No drymouth. No thyroid enlargement  CARDIOVASCULAR: No palpitations, or pain with walking, no orthopnea or PND + see above   RESPIRATORY: No dyspnea, cough, shortness of breath or wheezing. No pleurisy.   GI: No nausea, vomiting, diarrhea or constipation, no abdominal pain, or blood in stools.   : No change in urine, no dysuria or hematuria   MUSCKL: see above    INTEGUMENTARY: No concerning lesions or moles   NEURO: No loss of strength or sensation, no numbness or  "tingling, no tremor, no dizziness, no headache. No falls   ENDO: No polyuria or polydipsia, no temperature intolerance   HEME/LYMPH:No concerning bumps, bleeding problems, or swollen lymph nodes. No recent infections, hospitalizations or new illnesses.   ALLERGY: No environmental allergies   PSYCH:No depression or anxiety, no sleep problems.  Otherwise 14 point ROS obtained, reviewed and found negative.     Physical exam:  Vitals: Blood pressure (!) 154/91, pulse 80, temperature 98.8  F (37.1  C), temperature source Oral, height 1.626 m (5' 4\"), weight 100.4 kg (221 lb 6.4 oz), last menstrual period 03/02/2002, not currently breastfeeding.  Wt Readings from Last 4 Encounters:   05/09/19 100.4 kg (221 lb 6.4 oz)   06/04/18 96.2 kg (212 lb)   04/18/18 96 kg (211 lb 11.2 oz)   04/05/18 96 kg (211 lb 9.6 oz)     Constitutional: WD-WN-WG cooperative  Eyes: nl EOM, PERRLA, vision, conjunctiva, sclera  ENT: nl external ears, nose, hearing, lips, teeth, gums, throat  Neck: no mass or thyroid enlargement  Resp: lungs clear to auscultation, nl to palpation, nl effort  CV: RRR, no murmurs, rubs or gallops, no edema  GI: no ABD mass or tenderness, no HSM  : not tested  Lymph: no cervical or epitrochlear nodes  MS: tender over 5th MTP joint line, 1st MTP and this is more prominent with thickness. Mild tender in 1,5th MTP. S/t 2-3 MCPs, erthyema in fingers, left 2nd PIP s/t, fullness in fingers. halgux valgus  Knees +creptitus with halgus valgus, All other TMJ, neck, shoulder, elbow, wrist, hand, spine, hip, knee, ankle, and foot joints were examined and otherwise found normal. Normal  strength. Full ROM. Normal prayer sign. Negative Lhermitte's sign. No periuncle erythema  Skin: no nail pitting, alopecia, rash; scars over 1st and 5th MTPs.   Neuro: nl cranial nerves, strength, sensation, DTRs.   Psych: nl judgement, orientation, memory, affect.      Labs/Imaging:  Results for orders placed or performed in visit on 04/04/19 "   Erythrocyte sedimentation rate auto   Result Value Ref Range    Sed Rate 32 (A) <30 mm/hr   CBC with platelets differential   Result Value Ref Range    WBC 4.4 (A) 4.5 - 11.0 10^9/L    RBC Count 4.45 4.00 - 5.20 10^12/L    Hemoglobin 13.7 12.0 - 16.0 g/dL    Hematocrit 40.4 33.0 - 51.0 %    MCV 91 80 - 100 fl    MCH 30.8 26.0 - 34.0 pg    MCHC 33.9 32.0 - 36.0 g/dL    RDW 13.1 11.5 - 15.5 %    Platelet Count 154 140 - 440 10^9/L    % Neutrophils 67.8 %    % Lymphocytes 18.1 %    % Monocytes 7.5 %    % Eosinophils 6.1 %    % Basophils 0.5 %    Absolute Neutrophil 3.0 1.7 - 7.0    Absolute Lymphocytes 0.8 (A) 0.9 - 2.9    Absolute Monocytes 0.3 <0.9    Absolute Eosinophils 0.3 <0.5    Absolute Basophils (External) 0.0 <0.3   CRP inflammation   Result Value Ref Range    CRP Inflammation 0.42 <0.50   Creatinine   Result Value Ref Range    Creatinine 0.78 0.57 - 1.11 mg/dL    GFR Estimate >60 ml/min/1.73m2    GFR Estimate If Black >60 ml/min/1.73m2   AST   Result Value Ref Range    AST 25 2 - 40 U/L   ALT   Result Value Ref Range    ALT 17 8 - 45 U/L   Albumin level   Result Value Ref Range    Albumin 3.9 3.5 - 5.2 g/dL     Date of study: 8/9/18  CONCLUSION:  1. Normal spirometry.  2. Normal lung volumes  3. The DLCOc is mildly reduced.  The DL/VA is normal.  The presence of a reduced DLCOc that normalizes when corrected for volume is consistent with a non-parenchymal disorder but does not   rule out parenchymal disease.    Impression/Plan:    1. Sero-positive RA (-RF, -SUSHIL +CCP low positive 60 in 5/2012 outside lab, xrays 3/2014 No erosions) . Labs 4-2019 mild leukopenia, other labs normal. RA =moderate activity and off methotrexate injections due to no insurance coverage so only on imuran. Tolerated well but this is not controlling her arthritis. We discussed options of [facility]) and cost vs trial of abatacept (orencia) 125 mg injection every 7 days. Continue imuran 100 mg once day. Given her history of  leukocytoclastic vascultis with adalimumab (humira) and not seen Dr. Montoya in a while, I want her to schedule with him now. She agrees. Will recheck CCP, RF, SUSHIL, BETH panel, recheck x-rays of hands, feet, wrists to look for cryptic or erosive disease. Risks, benefits, possible side-effects of abatacept (orencia) discussed. I will send Dr. Montoya a message as well since he does know her well.  I dont know if we can appeal this. She will look at her denial letter  --Azothioprine 100mg day, folic acid 1 mg day Risk of skin cancer with this.   --High risk medications. DMARD every 8-12 weeks. No alcohol. Hold if any infections, fever, chills or cough  --yearly derm visit hx basal cell skin cancer   --RTC 1st available with Dr. Montoya   2. Lung symptom, SOB with night sweats. Hx vasculitis and pleural effusions. Based on this, will do CXR may need HR CT if symptoms return or worsen. PFT 8-2018 normal ([facility]) but note mild reduced DLCO.   3. High risk medications. Labs every 12 weeks.   4. Feet pain over areas of bunion surgery, may be mechanical or lose pins/screws. Will do x-rays and refer her to ortho foot specialist.   5. OA of the knees (needs TKR L>R). No synovitis. Undercare [facility]  6. Hx of Basal Cell Carcinoma -recent recurrence. Get yearly checks      The patient understood the rational for the diagnosis and treatment plan. Patient shared in the decision making. All questions were answered to best of my ability and the patient's satisfaction  Lj Robledo APRN, CNP, MSN  [facility]  Department of Rheumatology & Autoimmune Disorders

## 2019-05-09 NOTE — NURSING NOTE
"BP (!) 154/91   Pulse 80   Temp 98.8  F (37.1  C) (Oral)   Ht 1.626 m (5' 4\")   Wt 100.4 kg (221 lb 6.4 oz)   LMP 03/02/2002   BMI 38.00 kg/m    Chief Complaint   Patient presents with     RECHECK     follow up with RA, tzimmer cma       "

## 2019-05-09 NOTE — LETTER
May 17, 2019    Medical Management  Insurance name [employer]  Re: Jodie Dailey   1965    Dear Jacqueline,    We would like to request an appeal for the approval of injectable methotrexate for Ms. Dailey.     The patient has severe and destructive seropositive rheumatoid arthritis with a very high cyclic citrullinated peptide >250 putting at a risk for poor prognosis, risk of deformities and extra-articular manisfestations. ,jessa Dailey has been controlled on combination therapy of injectable methotrexate and azathioprine (imuran).      Previous use of a TNF inhibitor adalimumab (humira) resulted in serious side-effect of rheumatoid vasculitis, thus TNF inhibitors class of medications are contraindicated because of their molecular similarities. According to the American College of Rheumatology are 2015 guidelines we adhere to for the treatment of rheumatoid arthritis (RA). Methotrexate is used on rheumatoid arthritis (RA) as a first line treatment. She has not been able to tolerate the oral form due to side-effects, thus has been taking the injectable form.     Therefore, I believe it is medically appropriate to continue the methotrexate and ask for approval for the injectable methotrexate to preserve joint structure and function, and to limit the requirement for future joint replacement surgery. Being off this medication has resulted in a flare of her disease, and because of her high serologies this puts her at risk for loss of function and destruction of her joints.     Thank you for your reconsideration.  We will be waiting for your response.    Lj BUSBY, CNP, MSN with Dr. Ubaldo Montoya   [facility]  Department of Rheumatology & Autoimmune Disorders  [facility]: [phone] Opt 2, then Opt 1 Scheduling or Opt Nursing

## 2019-05-09 NOTE — PROGRESS NOTES
[facility] - Rheumatology Clinic Visit    Jodie Dailey  is a 53 year old with sero-positive RA (Neg SUSHIL, no erosions on xray 3/2014, -RF/ low positive 60 CCP in 5/2012). XR 3-2014 no erosions hands or feet. Sees. Dr. Montoya     Past: methotrexate 17.5mg week, azothioprine 100mg day [decreased 10/2011 from 150mg day] folic acid, adalimumab (humira) leukoclastic vasculitis, oral methotrexate (hairloss)       May 9, 2019  No show to appt with Dr. Montoya   Cardiology appt 6-2018, atypical chest pain and hypertension.    Continues imuran 100 mg once a day. Tolerating. Labs 4-2019 only mild leukopenia. Reports since off this her arthritis is flaring. Right hand into fingers swelling with pain in tendons and joints, MCPs, and left hands. Right 5th MTP area of the old bunion surgery feels like it is broke, [facility]. Some pain over right 1st MTP underneath in the area of the pin. Left 2nd PIP swelling with pain. Thought felt lungs last week briefly. In menopause, some night sweats. Sees her dermatologist consultants once a year. No vasculitis since she was on adalimumab (humira) (legs many years ago). Denies any fever, chills, SOB, LARIOS, night sweats, or chest pain, or cough. Reports healthy. Prior to this even when methotrexate not covered, she did not feel her arthritis was controlled. Knees are still bad, does go to [facility]. History of basal skin cancer on thigh and face. Gelling of the knees. No infections in a long time    Off x 2 weeks methotrexate  0.9 ML or 22.5 mg once week SQ SUN injections, FA 1 mg day --insurance does not cover this. Not able to tolerate oral methotrexate.     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  No MS or cancer in family  Mother-lymes, macular degeneration   Father-healthy  Brother-celiac  Niece-celiac   PGM/PGF-heart disease  MGM-cancer tumor lung      SH:  No Alcohol. NO Smoking. No IVDU. Children. Right handed.        PMSH personally reviewed and updated by me.    ROS:  Negative raynaud s phenomena, hairloss, sun sensitivities, keratoconjunctivitis sicca, pleurisy, inflammatory joint symptoms, significant rashes like malar, oral/nasal or ulcerations, inflammatory eye disease, inflammatory bowel disease, dactylitis, tenosynovitis, or enthespathy. No temporal headache, no jaw claudication, no scalp tenderness, vision changes, carotidynia, cough  +see above   CONSTITUTIONAL: No fevers or unintentional weight change. No acute distress, swollen glands  EYES: No vision change, diplopia, pain in eyes or red eyes   EARS, NOSE, MOUTH, THROAT: No tinnitus or hearing change, no epistaxis or nasal discharge, no oral lesions, throat clear. Normal saliva pool.  No drymouth. No thyroid enlargement  CARDIOVASCULAR: No palpitations, or pain with walking, no orthopnea or PND + see above   RESPIRATORY: No dyspnea, cough, shortness of breath or wheezing. No pleurisy.   GI: No nausea, vomiting, diarrhea or constipation, no abdominal pain, or blood in stools.   : No change in urine, no dysuria or hematuria   MUSCKL: see above    INTEGUMENTARY: No concerning lesions or moles   NEURO: No loss of strength or sensation, no numbness or tingling, no tremor, no dizziness, no headache. No falls   ENDO: No polyuria  "or polydipsia, no temperature intolerance   HEME/LYMPH:No concerning bumps, bleeding problems, or swollen lymph nodes. No recent infections, hospitalizations or new illnesses.   ALLERGY: No environmental allergies   PSYCH:No depression or anxiety, no sleep problems.  Otherwise 14 point ROS obtained, reviewed and found negative.     Physical exam:  Vitals: Blood pressure (!) 154/91, pulse 80, temperature 98.8  F (37.1  C), temperature source Oral, height 1.626 m (5' 4\"), weight 100.4 kg (221 lb 6.4 oz), last menstrual period 03/02/2002, not currently breastfeeding.  Wt Readings from Last 4 Encounters:   05/09/19 100.4 kg (221 lb 6.4 oz)   06/04/18 96.2 kg (212 lb)   04/18/18 96 kg (211 lb 11.2 oz)   04/05/18 96 kg (211 lb 9.6 oz)     Constitutional: WD-WN-WG cooperative  Eyes: nl EOM, PERRLA, vision, conjunctiva, sclera  ENT: nl external ears, nose, hearing, lips, teeth, gums, throat  Neck: no mass or thyroid enlargement  Resp: lungs clear to auscultation, nl to palpation, nl effort  CV: RRR, no murmurs, rubs or gallops, no edema  GI: no ABD mass or tenderness, no HSM  : not tested  Lymph: no cervical or epitrochlear nodes  MS: tender over 5th MTP joint line, 1st MTP and this is more prominent with thickness. Mild tender in 1,5th MTP. S/t 2-3 MCPs, erthyema in fingers, left 2nd PIP s/t, fullness in fingers. halgux valgus  Knees +creptitus with halgus valgus, All other TMJ, neck, shoulder, elbow, wrist, hand, spine, hip, knee, ankle, and foot joints were examined and otherwise found normal. Normal  strength. Full ROM. Normal prayer sign. Negative Lhermitte's sign. No periuncle erythema  Skin: no nail pitting, alopecia, rash; scars over 1st and 5th MTPs.   Neuro: nl cranial nerves, strength, sensation, DTRs.   Psych: nl judgement, orientation, memory, affect.      Labs/Imaging:  Results for orders placed or performed in visit on 04/04/19   Erythrocyte sedimentation rate auto   Result Value Ref Range    Sed Rate " 32 (A) <30 mm/hr   CBC with platelets differential   Result Value Ref Range    WBC 4.4 (A) 4.5 - 11.0 10^9/L    RBC Count 4.45 4.00 - 5.20 10^12/L    Hemoglobin 13.7 12.0 - 16.0 g/dL    Hematocrit 40.4 33.0 - 51.0 %    MCV 91 80 - 100 fl    MCH 30.8 26.0 - 34.0 pg    MCHC 33.9 32.0 - 36.0 g/dL    RDW 13.1 11.5 - 15.5 %    Platelet Count 154 140 - 440 10^9/L    % Neutrophils 67.8 %    % Lymphocytes 18.1 %    % Monocytes 7.5 %    % Eosinophils 6.1 %    % Basophils 0.5 %    Absolute Neutrophil 3.0 1.7 - 7.0    Absolute Lymphocytes 0.8 (A) 0.9 - 2.9    Absolute Monocytes 0.3 <0.9    Absolute Eosinophils 0.3 <0.5    Absolute Basophils (External) 0.0 <0.3   CRP inflammation   Result Value Ref Range    CRP Inflammation 0.42 <0.50   Creatinine   Result Value Ref Range    Creatinine 0.78 0.57 - 1.11 mg/dL    GFR Estimate >60 ml/min/1.73m2    GFR Estimate If Black >60 ml/min/1.73m2   AST   Result Value Ref Range    AST 25 2 - 40 U/L   ALT   Result Value Ref Range    ALT 17 8 - 45 U/L   Albumin level   Result Value Ref Range    Albumin 3.9 3.5 - 5.2 g/dL     Date of study: 8/9/18  CONCLUSION:  1. Normal spirometry.  2. Normal lung volumes  3. The DLCOc is mildly reduced.  The DL/VA is normal.  The presence of a reduced DLCOc that normalizes when corrected for volume is consistent with a non-parenchymal disorder but does not   rule out parenchymal disease.    Impression/Plan:    1. Sero-positive RA (-RF, -SUSHIL +CCP low positive 60 in 5/2012 outside lab, xrays 3/2014 No erosions) . Labs 4-2019 mild leukopenia, other labs normal. RA =moderate activity and off methotrexate injections due to no insurance coverage so only on imuran. Tolerated well but this is not controlling her arthritis. We discussed options of [facility]rx ([facility]) and cost vs trial of abatacept (orencia) 125 mg injection every 7 days. Continue imuran 100 mg once day. Given her history of leukocytoclastic vascultis with adalimumab (humira) and not seen Dr. Montoya in a  while, I want her to schedule with him now. She agrees. Will recheck CCP, RF, SUSHIL, BETH panel, recheck x-rays of hands, feet, wrists to look for cryptic or erosive disease. Risks, benefits, possible side-effects of abatacept (orencia) discussed. I will send Dr. Montoya a message as well since he does know her well.  I dont know if we can appeal this. She will look at her denial letter  --Azothioprine 100mg day, folic acid 1 mg day Risk of skin cancer with this.   --High risk medications. DMARD every 8-12 weeks. No alcohol. Hold if any infections, fever, chills or cough  --yearly derm visit hx basal cell skin cancer   --RTC 1st available with Dr. Montoya   2. Lung symptom, SOB with night sweats. Hx vasculitis and pleural effusions. Based on this, will do CXR may need HR CT if symptoms return or worsen. PFT 8-2018 normal ([facility]) but note mild reduced DLCO.   3. High risk medications. Labs every 12 weeks.   4. Feet pain over areas of bunion surgery, may be mechanical or lose pins/screws. Will do x-rays and refer her to ortho foot specialist.   5. OA of the knees (needs TKR L>R). No synovitis. [facility]  6. Hx of Basal Cell Carcinoma -recent recurrence. Get yearly checks      The patient understood the rational for the diagnosis and treatment plan. Patient shared in the decision making. All questions were answered to best of my ability and the patient's satisfaction  Lj Robledo APRN, CNP, MSN  [facility]  Department of Rheumatology & Autoimmune Disorders

## 2019-05-09 NOTE — TELEPHONE ENCOUNTER
Introduction: Rheumatology      Situation:   methotrexate  Injectable not covered by insurance thus off x 2 weeks (22.5mg).   On imuran 100 mg once day  Rheumatoid arthritis (RA) moderate to high active     Background:   Rheumatoid arthritis (RA) low CCP 2012  Adalimumab (humira) cause leukocytoclastic vasculitis   Not tolerate oral methotrexate      Assessment:  Rheumatoid arthritis (RA) active joints with swelling and pain, noted some SOB very brief this past week but no sx now. Some night sweats (in menopause)  PFT 8-2018 in care everywhere.   Seen cardiology 2018     Recommendation:  Dr Montoya discussed w/her trial of goodrx w/coupon for methotrexate (was not fully controlling arthritis), vs abatacept (orencia), she wishes for the latter    The other option is monotherapy tofacitinib (xeljantz) but she would have to stop imuran     She is scheduled with you 6-11    She would like you to review this before since she has history with adalimumab (humira) of leukocytoclastic vasculitis and will continue imuran     I did order CCP, RF, SUSHIL, EBTH, hep b/c, MTB and CXR     Addendum: abatacept (orencia) denied. Must have to try tocilizumab (actemra) or tofacitinib (xeljantz). Only erosion is right 5th MTP (area of old bunion surgery). RF low positive 29. High CCP >340. -SUSHIL.     Lj BUSBY, CNP, MSN   Pager: [phone]  [facility]: [phone] Opt 2, then Opt 1 Scheduling Opt 3 [facility]: [phone] Option 7 scheduling

## 2019-05-10 ENCOUNTER — TELEPHONE (OUTPATIENT)
Dept: RHEUMATOLOGY | Facility: CLINIC | Age: 54
End: 2019-05-10

## 2019-05-10 LAB
ANA SER QL IF: NEGATIVE
CCP AB SER IA-ACNC: >340 U/ML
HBV CORE AB SERPL QL IA: NONREACTIVE
HBV SURFACE AG SERPL QL IA: NONREACTIVE
HCV AB SERPL QL IA: NONREACTIVE
RHEUMATOID FACT SER NEPH-ACNC: 29 IU/ML (ref 0–20)

## 2019-05-10 NOTE — RESULT ENCOUNTER NOTE
Erosive change in the right 5th MTPs, this is the area of her prior bunion surgery. Given the prior surgery and local pain, I did refer her to a foot specialist. Some osteoarthritis. Achilles tendon insertional and plantar calcaneal enthesopathy.  No erosions to the hand or wrist joints noted

## 2019-05-10 NOTE — TELEPHONE ENCOUNTER
PRIOR AUTHORIZATION DENIED    Medication: ORENCIA - DENIED     Denial Date: 5/10/2019    Denial Rational:  Patient has not tired Actemra AND Xeljanz     Appeal Information:  See letter

## 2019-05-11 LAB
ENA RNP IGG SER IA-ACNC: <0.2 AI (ref 0–0.9)
ENA SCL70 IGG SER IA-ACNC: 0.5 AI (ref 0–0.9)
ENA SM IGG SER-ACNC: <0.2 AI (ref 0–0.9)
ENA SS-A IGG SER IA-ACNC: <0.2 AI (ref 0–0.9)
ENA SS-B IGG SER IA-ACNC: <0.2 AI (ref 0–0.9)

## 2019-05-13 ENCOUNTER — MYC MEDICAL ADVICE (OUTPATIENT)
Dept: RHEUMATOLOGY | Facility: CLINIC | Age: 54
End: 2019-05-13

## 2019-05-13 LAB
GAMMA INTERFERON BACKGROUND BLD IA-ACNC: 0.12 IU/ML
M TB IFN-G BLD-IMP: NEGATIVE
M TB IFN-G CD4+ BCKGRND COR BLD-ACNC: >10 IU/ML
MITOGEN IGNF BCKGRD COR BLD-ACNC: 0.01 IU/ML
MITOGEN IGNF BCKGRD COR BLD-ACNC: 0.02 IU/ML

## 2019-05-13 NOTE — TELEPHONE ENCOUNTER
[facility] Call Center    Phone Message    May a detailed message be left on voicemail: yes    Reason for Call: Other: [facility] is calling about the appeal for Methotrexate. The pt really prefers the Methotrexate and since it has been denied, it needs an urgent appeal.  Please fax the appeal to [facility] at F [phone], and it has to say THIS IS AN URGENT APPEAL across the top so they route it to the right dept.     Action Taken: Message routed to:  [facility] ([facility]

## 2019-05-13 NOTE — TELEPHONE ENCOUNTER
Would not use adalimumab (humira), golimumab (simponi) or certolizumab pegol (cimzia) due to severe adverse reaction of leukoclastic vasculitis with adalimumab (humira). Waiting for Dr. Montoya input. The patient would like to do an appeal to insurance for injectable methotrexate --ask Samuel Thomas RN for assistance on this

## 2019-05-14 ENCOUNTER — TELEPHONE (OUTPATIENT)
Dept: RHEUMATOLOGY | Facility: CLINIC | Age: 54
End: 2019-05-14

## 2019-05-14 NOTE — TELEPHONE ENCOUNTER
Prior Authorization Approval    Authorization Effective Date: 5/14/2019  Authorization Expiration Date: 5/14/2020  Medication: XELJANZ - Approved  Approved Dose/Quantity:  60 FOR 30 DAYS  Reference #: [id number]   Insurance Company: [employer] ([facility]) - Phone [phone] Fax [phone]  Expected CoPay: >$1200     CoPay Card Available: Yes    Foundation Assistance Needed:    Which Pharmacy is filling the prescription (Not needed for infusion/clinic administered): [facility] - [place], [place] - [address]  Pharmacy Notified:   Patient Notified:

## 2019-05-14 NOTE — TELEPHONE ENCOUNTER
I agree.  The appeal letter should clearly state that she developed vasculitis secondary to Humira and that other TNF inhibitors are contraindicated because of their molecular similarity.

## 2019-05-14 NOTE — TELEPHONE ENCOUNTER
PA Initiation    Medication: XELJANZ   Insurance Company: [employer] ([facility]) - Phone [phone] Fax [phone]  Pharmacy Filling the Rx: [facility] [place] - [address]  Filling Pharmacy Phone:    Filling Pharmacy Fax:    Start Date: 5/14/2019

## 2019-05-15 NOTE — TELEPHONE ENCOUNTER
Samuel, call her as I am confused if she wants the methotrexate   Injectable appealed to see if this is approved under appeal (do the letter).     Dr. Montoya, if she switches to tofacitinib (xeljantz) how long does she have to be off imuran? Off methotrexate  Injection for 2 1/2 week now as not covered.

## 2019-05-15 NOTE — TELEPHONE ENCOUNTER
There is no need to worry about overlap between azathioprine and Xeljanz.  Both drugs have very short half-lives.  2 to 3 days would be plenty.    Have we looked into the possibility of free drug through 1 of the companies that does prefilled methotrexate syringes that are so expensive?  If her insurance does not consider appeals this should be at least explored.

## 2019-05-16 NOTE — TELEPHONE ENCOUNTER
Left message on pt's cell number asking that she return call to Roseann pharmacy liaison of myself to discuss different options for her methotrexate.    [employer] or pt assistance program for Doreen.    Samuel Thomas, PRAKASHN RN  Rheumatology RN Coordinator  [facility]

## 2019-05-16 NOTE — TELEPHONE ENCOUNTER
Consulted with Roseann. [employer] has options that she could take advantage of where she could get a 2 ml vial of 2.5 mg/ml at [facility] for $8.15 or 4 vials for $15.88. There is a free drug program with [employer] but this would be dependent of pt's income.     Called pt and left message asking that she return call to Roseann or myself to discuss the above.    PRAKASH OchoaN RN  Rheumatology RN Coordinator   [facility]

## 2019-05-24 DIAGNOSIS — Z85.828 HISTORY OF BASAL CELL CARCINOMA: ICD-10-CM

## 2019-05-24 DIAGNOSIS — M05.79 RHEUMATOID ARTHRITIS INVOLVING MULTIPLE SITES WITH POSITIVE RHEUMATOID FACTOR (H): ICD-10-CM

## 2019-05-30 NOTE — TELEPHONE ENCOUNTER
Called insurance, spoke to Roseann - stated that appeal is still in process - could not give me an ETA on when an approval would be available, I did verify that it was received as urgent.

## 2019-06-28 ENCOUNTER — TELEPHONE (OUTPATIENT)
Dept: RHEUMATOLOGY | Facility: CLINIC | Age: 54
End: 2019-06-28

## 2019-06-28 RX ORDER — METHOTREXATE SODIUM 2.5 MG/1
TABLET ORAL WEEKLY
COMMUNITY
End: 2019-06-28

## 2019-06-28 NOTE — TELEPHONE ENCOUNTER
Left a message for Jodie to return my call .    Cassy Rowland MSN, RN  Rheumatology RN Care Coordinator  [facility]

## 2019-06-28 NOTE — TELEPHONE ENCOUNTER
Called insurance, spoke to Ranjit - mark for injectable methotrexate is denied due to non-formulary.  [employer] is preferred.  [facility] has a free drug program that has been discussed with patient, but patient wanted to hold off.  Patient has been able to obtain methotrexate vial and syringe at [facility] at lower cost thru [employer].  Will route to provider for next steps

## 2019-07-01 ENCOUNTER — TELEPHONE (OUTPATIENT)
Dept: RHEUMATOLOGY | Facility: CLINIC | Age: 54
End: 2019-07-01

## 2019-07-01 DIAGNOSIS — M05.79 RHEUMATOID ARTHRITIS INVOLVING MULTIPLE SITES WITH POSITIVE RHEUMATOID FACTOR (H): Primary | ICD-10-CM

## 2019-07-01 DIAGNOSIS — Z79.899 ENCOUNTER FOR LONG-TERM CURRENT USE OF MEDICATION: ICD-10-CM

## 2019-07-01 RX ORDER — METHOTREXATE 25 MG/ML
0.9 INJECTION INTRA-ARTERIAL; INTRAMUSCULAR; INTRATHECAL; INTRAVENOUS WEEKLY
Refills: 0 | COMMUNITY
Start: 2019-05-29 | End: 2020-05-01

## 2019-07-01 NOTE — TELEPHONE ENCOUNTER
Prior Authorization Retail Medication Request    Medication/Dose: methotrexate 50mg  ICD code (if different than what is on RX):  M06.9, Z76.0, [Z85.828  Previously Tried and Failed:    Rationale:      Insurance Name:    Insurance ID:        Pharmacy Information (if different than what is on RX)  Name:    Phone:

## 2019-07-01 NOTE — TELEPHONE ENCOUNTER
Spoke with Jodie and updated her medication list.      Cassy Rowland MSN, RN  Rheumatology RN Care Coordinator  [facility]

## 2019-07-02 DIAGNOSIS — M06.09 RHEUMATOID ARTHRITIS OF MULTIPLE SITES WITHOUT RHEUMATOID FACTOR (H): ICD-10-CM

## 2019-07-03 NOTE — TELEPHONE ENCOUNTER
Please advise. I may be a little lost in reading the notes. It appears to my understanding that insurance is not going to cover this, as the P/A and the Appeal have been denied, and she is able to pay cash for this at a local pharmacy?

## 2019-07-05 NOTE — TELEPHONE ENCOUNTER
azaTHIOprine (IMURAN) 50 MG tablet     Last Written Prescription Date:  5/9/2019  Last Fill Quantity: 180,   # refills: 0  Last Office Visit: 5/9/2019  Future Office visit:  11/19/2019    CBC RESULTS:   Recent Labs   Lab Test 04/02/19  1459   WBC 4.4*   RBC 4.45   HGB 13.7   HCT 40.4   MCV 91   MCH 30.8   MCHC 33.9   RDW 13.1          Creatinine   Date Value Ref Range Status   04/02/2019 0.78 0.57 - 1.11 mg/dL Final   ]    Liver Function Studies -   Recent Labs   Lab Test 04/02/19  1459  03/02/12 10/20/11  1635   PROTTOTAL  --   --   --  7.0   ALBUMIN 3.9   < > 3.4* 3.9   BILITOTAL  --   --   --  1.0   ALKPHOS  --   --  87 79   AST 25   < > 19 33   ALT 17   < > 31 10    < > = values in this interval not displayed.       Routing refill request to provider for review/approval because:  Med. end date 7/1/2019. Labs due now.

## 2019-07-08 RX ORDER — AZATHIOPRINE 50 MG/1
100 TABLET ORAL DAILY
Qty: 180 TABLET | Refills: 0 | Status: SHIPPED | OUTPATIENT
Start: 2019-07-08 | End: 2019-11-03

## 2019-07-08 NOTE — TELEPHONE ENCOUNTER
Left message for Jodie to return my call but will also send her a mychart message regarding labs due.    Cassy Rowland MSN, RN  Rheumatology RN Care Coordinator  [facility]

## 2019-07-08 NOTE — TELEPHONE ENCOUNTER
Let her know her labs are due now and I need them to refill her meds   Update EHR with methotrexate  Dose and what she is doing

## 2019-07-16 ENCOUNTER — TRANSFERRED RECORDS (OUTPATIENT)
Dept: HEALTH INFORMATION MANAGEMENT | Facility: CLINIC | Age: 54
End: 2019-07-16

## 2019-07-22 ENCOUNTER — RECORDS - HEALTHEAST (OUTPATIENT)
Dept: ADMINISTRATIVE | Facility: OTHER | Age: 54
End: 2019-07-22

## 2019-07-22 LAB
LAB AP CHARGES (HE HISTORICAL CONVERSION): NORMAL
PATH REPORT.COMMENTS IMP SPEC: NORMAL
PATH REPORT.FINAL DX SPEC: NORMAL
PATH REPORT.GROSS SPEC: NORMAL
PATH REPORT.MICROSCOPIC SPEC OTHER STN: NORMAL
PATH REPORT.RELEVANT HX SPEC: NORMAL
RESULT FLAG (HE HISTORICAL CONVERSION): NORMAL

## 2019-07-26 ENCOUNTER — TELEPHONE (OUTPATIENT)
Dept: RHEUMATOLOGY | Facility: CLINIC | Age: 54
End: 2019-07-26

## 2019-07-26 ENCOUNTER — DOCUMENTATION ONLY (OUTPATIENT)
Dept: RHEUMATOLOGY | Facility: CLINIC | Age: 54
End: 2019-07-26

## 2019-07-26 NOTE — TELEPHONE ENCOUNTER
Returned call. They wanted to verify the frequency of the monitoring labs. Informed them they should be every 8-12 weeks.    Maria A verbalized understanding.    PRAKASH OchoaN RN  Rheumatology RN Coordinator  [facility]

## 2019-07-26 NOTE — PROGRESS NOTES
[facility] stating [facility] needed current lab orders. Updated lab orders have been faxed to Sourav in [facility] at [phone].  Jessika Lockwood CMA  7/26/2019 9:25 AM

## 2019-07-26 NOTE — TELEPHONE ENCOUNTER
Spoke with Jodie and she states that she believes she received a letter from her insurance company that told her the methotrexate was going to be covered.     She states that she was paying out of pocket prior and if needed, she will continue to do so.     Cassy GAN, RN  Rheumatology RN Care Coordinator  [facility]

## 2019-07-26 NOTE — TELEPHONE ENCOUNTER
[facility]    Phone Message    May a detailed message be left on voicemail: no    Reason for Call: Other: Maria A from [employer] calling regarding patient's lab orders and the frequency. She would like a call back asap at [phone] to advise her regarding the orders. Thanks.      Action Taken: Message routed to:  [facility] rheum

## 2019-08-31 DIAGNOSIS — M06.9 RHEUMATOID ARTHRITIS (H): ICD-10-CM

## 2019-09-04 RX ORDER — FOLIC ACID 1 MG/1
TABLET ORAL
Qty: 180 TABLET | Refills: 3 | OUTPATIENT
Start: 2019-09-04

## 2019-09-10 DIAGNOSIS — M06.9 RHEUMATOID ARTHRITIS (H): ICD-10-CM

## 2019-09-15 RX ORDER — FOLIC ACID 1 MG/1
TABLET ORAL
Qty: 180 TABLET | Refills: 3 | OUTPATIENT
Start: 2019-09-15

## 2019-09-16 DIAGNOSIS — M05.79 RHEUMATOID ARTHRITIS INVOLVING MULTIPLE SITES WITH POSITIVE RHEUMATOID FACTOR (H): Primary | ICD-10-CM

## 2019-09-18 RX ORDER — FOLIC ACID 1 MG/1
TABLET ORAL
Qty: 180 TABLET | Refills: 3 | Status: SHIPPED | OUTPATIENT
Start: 2019-09-18 | End: 2020-11-06

## 2019-09-18 NOTE — TELEPHONE ENCOUNTER
Folic acid 1 mg BID     Last Written Prescription Date:  5/9/2019  Last Fill Quantity 180 : ,   # refills: 0  Last Office Visit : 5/9/2019  Future Office visit:  11/19/2019    Routing refill request to provider for review/approval because:  Not on current med list

## 2019-10-10 ENCOUNTER — DOCUMENTATION ONLY (OUTPATIENT)
Dept: CARE COORDINATION | Facility: CLINIC | Age: 54
End: 2019-10-10

## 2019-11-03 ENCOUNTER — HEALTH MAINTENANCE LETTER (OUTPATIENT)
Age: 54
End: 2019-11-03

## 2019-11-03 DIAGNOSIS — M06.09 RHEUMATOID ARTHRITIS OF MULTIPLE SITES WITHOUT RHEUMATOID FACTOR (H): ICD-10-CM

## 2019-11-04 RX ORDER — AZATHIOPRINE 50 MG/1
TABLET ORAL
Qty: 180 TABLET | Refills: 0 | Status: SHIPPED | OUTPATIENT
Start: 2019-11-04 | End: 2020-07-07

## 2019-11-04 NOTE — TELEPHONE ENCOUNTER
azaTHIOprine (IMURAN) 50 MG tablet      Last Written Prescription Date:  7/8/19  Last Fill Quantity: 180,   # refills: 0  Last Office Visit: 5/9/19  Future Office visit:  11/19/19    CBC RESULTS:   Recent Labs   Lab Test 04/02/19  1459   WBC 4.4*   RBC 4.45   HGB 13.7   HCT 40.4   MCV 91   MCH 30.8   MCHC 33.9   RDW 13.1          Creatinine   Date Value Ref Range Status   04/02/2019 0.78 0.57 - 1.11 mg/dL Final   ]    Liver Function Studies -   Recent Labs   Lab Test 04/02/19  1459  03/02/12 10/20/11  1635   PROTTOTAL  --   --   --  7.0   ALBUMIN 3.9   < > 3.4* 3.9   BILITOTAL  --   --   --  1.0   ALKPHOS  --   --  87 79   AST 25   < > 19 33   ALT 17   < > 31 10    < > = values in this interval not displayed.       Routing refill request to provider for review/approval because:    Drug not on the [facility], [facility] or [facility] refill protocol or controlled substance    Overdue labs

## 2019-11-04 NOTE — TELEPHONE ENCOUNTER
Sent pt a mychart letting her know that she is to get labs done.     Andressa Villareal, BSN RN   Rheumatology Clinic Nurse   [facility]

## 2019-11-06 NOTE — TELEPHONE ENCOUNTER
This is great my computer was not updating this. I do need the CBC the end of the month. Please make sure she has lab orders

## 2019-11-06 NOTE — TELEPHONE ENCOUNTER
Patient has standing lab orders and I sent a message to her to tell her that Lj wants her to get labs done at the end of the month.    Andressa Villareal, BSN RN   Rheumatology Clinic Nurse   [facility]

## 2019-11-19 ENCOUNTER — OFFICE VISIT (OUTPATIENT)
Dept: RHEUMATOLOGY | Facility: CLINIC | Age: 54
End: 2019-11-19
Attending: INTERNAL MEDICINE
Payer: COMMERCIAL

## 2019-11-19 VITALS
HEART RATE: 87 BPM | OXYGEN SATURATION: 96 % | HEIGHT: 64 IN | DIASTOLIC BLOOD PRESSURE: 85 MMHG | SYSTOLIC BLOOD PRESSURE: 147 MMHG | WEIGHT: 214.2 LBS | TEMPERATURE: 98.2 F | BODY MASS INDEX: 36.57 KG/M2

## 2019-11-19 DIAGNOSIS — M05.79 RHEUMATOID ARTHRITIS INVOLVING MULTIPLE SITES WITH POSITIVE RHEUMATOID FACTOR (H): Primary | ICD-10-CM

## 2019-11-19 DIAGNOSIS — Z79.899 ENCOUNTER FOR LONG-TERM CURRENT USE OF MEDICATION: ICD-10-CM

## 2019-11-19 DIAGNOSIS — M19.90 INFLAMMATORY ARTHRITIS: ICD-10-CM

## 2019-11-19 DIAGNOSIS — R76.8 POSITIVE ANTI-CCP TEST: ICD-10-CM

## 2019-11-19 DIAGNOSIS — Z79.899 HIGH RISK MEDICATION USE: ICD-10-CM

## 2019-11-19 PROCEDURE — G0463 HOSPITAL OUTPT CLINIC VISIT: HCPCS | Mod: ZF

## 2019-11-19 ASSESSMENT — PAIN SCALES - GENERAL: PAINLEVEL: NO PAIN (0)

## 2019-11-19 ASSESSMENT — MIFFLIN-ST. JEOR: SCORE: 1556.6

## 2019-11-19 NOTE — LETTER
2019    RE: Jodie Zuñiga  [address] [facility] [address]     [facility]  Rheumatology Clinic  Ubaldo Montoya MD  2019     Name: Jodie Dailey  MRN: [id number]  Age: 54 year old  : 1965  Referring provider: Referred Self    Problem List:  Sero-positive RA (-RF, -SUSHIL, +CCP low positive 60 in 2012 outside lab, X-rays 3/2014 No erosions)    Assessment and Plan:  The patient relates stable disease process, though she did sustain an acute injury to her right knee within the past few days. Blood work from 2019 showed stable blood counts, creatinine, and liver tests were normal, MPV was elevated at 11.6, sed rate was 34, and CRP was 0.54.    Seropositive rheumatoid arthritis seems to be stable presently. We could try Rasuvo as generic methotrexate is not being covered by her insurance company. She may try going down from 0.9 mL methotrexate to 0.8 mL weekly, but I do not think this is vital at this time. She could do this because her X-rays don't show damage, but she does show borderline inflammation, so I would be careful about decreasing her dosage.     With regard to her current knee pain, which is chronic and degenerative in nature, though she did acutely exacerbate it while going down stairs, she may want to pursue Orthopedic surgical consultation .    Follow-up: In 4-6 months with Lj Robledo.    HPI:   Jodie Dailey is a 54 year old female with a history including seropositive rheumatoid arthritis and leukocytoclastic vasculitis who presents for follow-up. The patient was last seen in Rheumatology by Lj Pro on 2019, at which time reported swelling and pain to the right hand/fingers and left hand. The plan was to continue Imuran 100 mg once daily with folic acid 1 mg daily with intent to consider trial of Orencia.      Today, she reports that she feels fairly stable though her right lateral hip and right knee have been somewhat bothersome. She states that she may have  "hyperextending her right knee while walking down stairs a few days ago. The patient denies morning stiffness, but she does have around 5 minutes of stiffness after periods of prolonged sitting.     She notes that she was told by an Orthopedic provider that her right knee was highly arthritic years ago. She adds that she had surgery on her right 5th metatarsal cyst resection.     She states that she sent a letter to her insurance company in May to appeal for methotrexate coverage, but this was denied. She does continue to take methotrexate 0.9 mL weekly.      Review of Systems:   Pertinent items are noted in HPI or as below, remainder of complete ROS is negative.      No recent problems with hearing or vision. No swallowing problems.   No breathing difficulty, shortness of breath, coughing, or wheezing.  No chest pain or palpitations.  No heart burn, indigestion, abdominal pain, nausea, vomiting, diarrhea.  No urination problems, no bloody, cloudy urine, no dysuria.  No numbing, tingling, weakness.  No headaches or confusion.  No rashes. No easy bleeding or bruising.     Active Medications:   Current Outpatient Medications:      azaTHIOprine (IMURAN) 50 MG tablet, TAKE 2 TABLETS BY MOUTH  DAILY  LABS DUE NOW AND EVERY 8-12 WEEKS, Disp: 180 tablet, Rfl: 0     Calcium Carbonate-Vitamin D (CALCIUM 500 + D PO), Take 1 tablet by mouth daily., Disp: , Rfl:      Cyanocobalamin (VITAMIN B12 PO), Take 1 tablet by mouth daily., Disp: , Rfl:      folic acid (FOLVITE) 1 MG tablet, TAKE 1 TABLET BY MOUTH TWO  TIMES DAILY, Disp: 180 tablet, Rfl: 3     hydrochlorothiazide (HYDRODIURIL) 25 MG tablet, Take 1 tablet by mouth daily., Disp: , Rfl:      methotrexate sodium, pres-free, 50 MG/2ML SOLN injection CHEMO, Inject 0.9 mLs Subcutaneous once a week, Disp: , Rfl: 0     Pyridoxine HCl (B-6 PO), Take 1 tablet by mouth daily., Disp: , Rfl:      Syringe/Needle, Disp, 27G X 1/2\" 1 ML MISC, 1 Syringe once a week Use as directed for " "weekly injections for Methotrexate, Disp: 12 each, Rfl: 1     valACYclovir (VALTREX) 1000 mg tablet, Take 1 g by mouth, Disp: , Rfl:     Allergies:   TNF antagonists  Seasonal allergies     Past Medical History:  Anemia   Basal cell cancer   Benign neoplasm of breast   Hypertension   Left ankle injury   Left knee degenerative joint disease   Leukocytoclastic vasculitis   Nasal polyps   Obesity  Pleural effusions   Pneumonia   Premenopausal patient   Pure hypercholesterolemia   Iron deficiency anemia  Lyme disease  Chronic sinusitis  Restless legs syndrome  Endocrine disorder  Hemorrhoids  History of basal cell carcinoma  Rheumatoid arthritis involving multiple sites with positive rheumatoid factor  Positive anti-CCP test    Past Surgical History:  C Stereotactic breast biopsy, left 01/28/1999  Excision benign skin lesion trunk/arm/leg, right axilla 01/28/1999  Excision breast lesion open, left 01/28/1999  Place need wire preop, breast, left 01/28/1999  Right knee surgery [id number]  Sinus surgery, polyps removed 2001    Family History:    No autoimmune disorders, psoriasis, UC, crohn's, SLE, RA, PsA, gout.  No MS or cancer in family  Mother-lymes, macular degeneration   Father-healthy  Brother-celiac  Niece-celiac   PGM/PGF-heart disease  MGM-cancer tumor lung      Social History:  The patient reports that she has never smoked. She has never used smokeless tobacco. She reports that she does not drink alcohol or use drugs. Right-handed.   PCP: Marga Turner  Marital Status:      Physical Exam:   BP (!) 147/85   Pulse 87   Temp 98.2  F (36.8  C) (Oral)   Ht 1.626 m (5' 4\")   Wt 97.2 kg (214 lb 3.2 oz)   LMP 03/02/2002   SpO2 96%   BMI 36.77 kg/m      Wt Readings from Last 4 Encounters:   11/19/19 97.2 kg (214 lb 3.2 oz)   05/09/19 100.4 kg (221 lb 6.4 oz)   06/04/18 96.2 kg (212 lb)   04/18/18 96 kg (211 lb 11.2 oz)     Constitutional: Well-developed, appearing stated age; cooperative.  Eyes: " Normal EOM, PERRLA, vision, conjunctiva, sclera.  ENT: Normal external ears, nose, hearing, lips, teeth, gums, throat. No mucous membrane lesions, normal saliva pool.  Neck: No mass or thyroid enlargement.  Resp: Lungs clear to auscultation, nl to palpation.  CV: RRR, no murmurs, rubs or gallops, no edema.  GI: No ABD mass or tenderness, no HSM.  : Not tested.  Lymph: No cervical, supraclavicular, inguinal or epitrochlear nodes.  MS: The TMJ, neck, shoulder, elbow, wrist, MCP/PIP/DIP, spine, hip, knee, ankle, and foot MTP/IP joints were examined and found normal. No active synovitis or altered joint anatomy. Full joint ROM. Normal  strength. No dactylitis, tenosynovitis, enthesopathy.  Skin: No nail pitting, alopecia, rash, nodules or lesions  Neuro: Normal cranial nerves, strength, sensation, DTRs.   Psych: Normal judgement, orientation, memory, affect.     Imaging:  XR Wrist Bilateral (05/09/2019):  1. No acute osseous abnormality.  2. No visualized erosive changes.    XR Hand Bilateral (05/09/2019):  1. No acute osseous abnormality.  2. No erosive changes.    XR Foot Bilateral (05/09/2019):  1. No acute osseous abnormality.  2. Periarticular erosive changes at the base of the fifth right  metatarsophalangeal joint consistent with inflammatory arthritis.   3. The irregular straightening of the lateral aspect of the fifth  metatarsal heads may be related to prior surgery, recommend clinical  Correlation.    Laboratory:   RHEUM RESULTS Latest Ref Rng & Units 12/31/2018 4/2/2019 5/9/2019   COMPLEMENT C3 90 - 200 mg/dL - - -   COMPLEMENT C4 15 - 50 mg/dL - - -   DNA-DS IU/mL - - -   SED RATE <30 mm/hr 31 32(A) -   CRP, INFLAMMATION <0.50 0.31 0.42 -   CYCLIC CIT PEPT IGG <5 U/mL - - -   RHEUMATOID FACTOR <20 IU/mL - - 29(H)   SUSHIL SCREEN BY EIA <1.0 - - -   AST 2 - 40 U/L 27 25 -   ALT 8 - 45 U/L 19 17 -   ALBUMIN 3.5 - 5.2 g/dL 3.9 3.9 -   WBC 4.5 - 11.0 10:9/L 4.5 4.4(A) -   RBC 4.00 - 5.20 10:12/L 4.51 4.45 -    HGB 12.0 - 16.0 g/dL 14.1 13.7 -   HCT 33.0 - 51.0 % 41.2 40.4 -   MCV 80 - 100 fl 91 91 -   MCHC 32.0 - 36.0 g/dL 34.2 33.9 -   RDW 11.5 - 15.5 % 13.0 13.1 -    - 440 10:9/L - - -   CREATININE 0.57 - 1.11 mg/dL 0.76 0.78 -   GFR ESTIMATE, IF BLACK ml/min/1.73m2 >60 >60 -   GFR ESTIMATE ml/min/1.73m2 >60 >60 -    - 1620 mg/dL - - -   IGA 70 - 380 mg/dL - - -   IGM 60 - 265 mg/dL - - -   HEPATITIS C ANTIBODY NR:Nonreactive - - Nonreactive     Rheumatoid Factor   Date Value Ref Range Status   05/09/2019 29 (H) <20 IU/mL Final     Cyclic Citrullinated Peptide Antibody, IgG   Date Value Ref Range Status   05/09/2019 >340 (H) <7 U/mL Final     Comment:     Positive     Cyclic Cit Pept IgG/IgA   Date Value Ref Range Status   05/15/2012 60.60  Final     Cyclic Citrullinated Peptide IgG   Date Value Ref Range Status   08/18/2009 43 (H) <5 U/mL Final     Comment:     Interpretation:  Positive     Scleroderma Antibody Scl-70 BETH IgG   Date Value Ref Range Status   05/09/2019 0.5 0.0 - 0.9 AI Final     Comment:     Negative  Antibody index (AI) values reflect qualitative changes in antibody   concentration that cannot be directly associated with clinical condition or   disease state.       SSA (Ro) (BETH) Antibody, IgG   Date Value Ref Range Status   05/09/2019 <0.2 0.0 - 0.9 AI Final     Comment:     Negative  Antibody index (AI) values reflect qualitative changes in antibody   concentration that cannot be directly associated with clinical condition or   disease state.       SSB (La) (BETH) Antibody, IgG   Date Value Ref Range Status   05/09/2019 <0.2 0.0 - 0.9 AI Final     Comment:     Negative  Antibody index (AI) values reflect qualitative changes in antibody   concentration that cannot be directly associated with clinical condition or   disease state.       SSA (RO) Antibody IgG   Date Value Ref Range Status   03/04/2008 3  Final     Comment:     Reference range: <     40  Unit: AU/mL  (Note)  REFERENCE  INTERVALS: SSA (Ro) (BETH) Ab, IgG     29 AU/mL or Less............... Negative     30 - 40 AU/mL ................. Equivocal     41 AU/mL or Greater ........... Positive    SSA (Ro) antibody is seen in 70-75% of Sjogren syndrome  cases, 30-40% of systemic lupus erythematosus, and  5-10% of progressive systemic sclerosis.  Performed by [facility],  [address] [place] [address] [phone]  [email or web address],  Mat Snow MD - Lab. Director     SSB (LA) Antibody IgG   Date Value Ref Range Status   03/04/2008 0  Final     Comment:     Reference range: <     40  Unit: AU/mL  (Note)  REFERENCE INTERVALS: SSB (La) (BETH) Ab, IgG     29 AU/mL or Less............... Negative     30 - 40 AU/mL ................. Equivocal     41 AU/mL or Greater ........... Positive    SSB (La) antibody is seen in 50-60% of Sjogren syndrome  cases and is specific if it is the only BETH antibody  present. Fifteen-25% of systemic lupus erythematosus and  5-10% of progressive systemic sclerosis also have this  antibody.  Performed by [facility],  [address] [place] [address] [phone]  [email or web address],  Mat Snow MD - Lab. Director     SUSHIL interpretation   Date Value Ref Range Status   05/09/2019 Negative NEG^Negative Final     Comment:                                        Reference range:  <1:40  NEGATIVE  1:40 - 1:80  BORDERLINE POSITIVE  >1:80 POSITIVE       SUSHIL Screen by EIA   Date Value Ref Range Status   03/13/2014 <1.0  Interpretation:  Negative <1.0 Final     DNA-ds   Date Value Ref Range Status   03/04/2008 5 IU/mL Final     Comment:     Interpretation:  Negative     Hepatitis B Core Sarah   Date Value Ref Range Status   05/09/2019 Nonreactive NR^Nonreactive Final     Hep B Surface Agn   Date Value Ref Range Status   05/09/2019 Nonreactive NR^Nonreactive Final     Quantiferon-TB Gold Plus Result   Date Value Ref Range Status   05/09/2019 Negative NEG^Negative Final     Comment:     No interferon gamma  response to M.tuberculosis antigens was detected.   Infection with M.tuberculosis is unlikely, however a single negative result   does not exclude infection. In patients at high risk for infection, a second   test should be considered  in accordance with the 2017 ATS/IDSA/CDC Clinical Practice Guidelines for   Diagnosis of Tuberculosis in Adults and Children [Micah NOBLES et   al.Clin.Infect.Dis. 2017 64(2):111-115].       TB1 Ag minus Nil Value   Date Value Ref Range Status   05/09/2019 0.02 IU/mL Final     TB2 Ag minus Nil Value   Date Value Ref Range Status   05/09/2019 0.01 IU/mL Final     Mitogen minus Nil Result   Date Value Ref Range Status   05/09/2019 >10.00 IU/mL Final     Nil Result   Date Value Ref Range Status   05/09/2019 0.12 IU/mL Final     Neutrophil Cytoplasmic IgG Antibody   Date Value Ref Range Status   03/04/2008 < 1:20  Final     Comment:     Reference range: < 1:20  (Note)  TEST INFORMATION: Anti-Neutrophil Cyto Ab, IgG  Neutrophil Cytoplasmic Antibodies (C-ANCA = granular  cytoplasmic staining, P-ANCA = perinuclear staining) are  found in the serum of over 90% of patients with certain  necrotizing systemic vasculitides, and usually in less  than 5% of patients with collagen vascular disease or  arthritis.                  ANCA'S IN VASCULITIC SYNDROMES                         Approx %      Approx % of                         Pos ANCA    patients demonstrating  ----------------------------------------------------------                (combined patterns)   C Pattern   P Pattern  Wegener's granulomatosis:  -Active Generalized   85 - 92 85 - 90       2 - 5  -Limited forms        60 - 67 85 - 90       2 - 5  -Inactive             30 - 35        85 - 90       2 - 5  Idiopathic Crescentic  Glomerulonephritis      80            some*     majority*  Polyarteritis Nodosa    50             86           14  Churg - Lion         50             80           20  SLE                   less  than 10     0    greater than 90  Rheumatoid Arthritis  less than 5      0    greater than 90  Sjogren's Syndrome        25           0    greater than 90  ----------------------------------------------------------  *No quantitation in the literature.  Performed by [email or web address],  [address] [place] [address] [phone]  [email or web address],  Mat Snow MD - Lab. Director                                                Albumin Fraction   Date Value Ref Range Status   10/20/2011 3.7 3.7 - 5.1 g/dL Final     Alpha 2 Fraction   Date Value Ref Range Status   10/20/2011 0.7 0.5 - 0.9 g/dL Final     Beta Fraction   Date Value Ref Range Status   10/20/2011 0.7 0.6 - 1.0 g/dL Final     Gamma Fraction   Date Value Ref Range Status   10/20/2011 1.0 0.7 - 1.6 g/dL Final     Monoclonal Peak   Date Value Ref Range Status   10/20/2011 0.0 0.0 g/dL Final     ELP Interpretation:   Date Value Ref Range Status   10/20/2011   Final    Essentially normal electrophoretic pattern.  No monoclonal protein seen.   Pathologic significance requires clinical correlation.  Cornell Lr M.D., Ph.D., Pathologist     Immunofixation ELP   Date Value Ref Range Status   10/20/2011   Final    No monoclonal protein seen on immunofixation.  Pathological significance   requires clinical correlation.   Cornell Lr M.D., Ph.D.     IGG   Date Value Ref Range Status   10/20/2011 1010 695 - 1620 mg/dL Final     IGA   Date Value Ref Range Status   10/20/2011 358 70 - 380 mg/dL Final     IGM   Date Value Ref Range Status   10/20/2011 85 60 - 265 mg/dL Final     Cintron BETH Antibody IgG   Date Value Ref Range Status   05/09/2019 <0.2 0.0 - 0.9 AI Final     Comment:     Negative  Antibody index (AI) values reflect qualitative changes in antibody   concentration that cannot be directly associated with clinical condition or   disease state.       Scleroderma Antibody Scl-70 BETH IgG   Date Value Ref Range Status   05/09/2019 0.5 0.0 - 0.9  AI Final     Comment:     Negative  Antibody index (AI) values reflect qualitative changes in antibody   concentration that cannot be directly associated with clinical condition or   disease state.       Scribe Disclosure:  I, Raad Morrison, am serving as a scribe to document services personally performed by Ubaldo Montoya MD at this visit, based upon the provider's statements to me. All documentation has been reviewed by the aforementioned provider prior to being entered into the official medical record.    YG Montoya MD, PhD    Rheumatology

## 2019-11-19 NOTE — LETTER
2019       RE: Jodie Zuñiga  [address]  [facility] [address]     Dear Colleague,    Thank you for referring your patient, Jodie Zuñiga, to the [facility] RHEUMATOLOGY at [facility]. Please see a copy of my visit note below.    [facility]  Rheumatology Clinic  Ubaldo Montoya MD  2019     Name: Jodie Dailey  MRN: [id number]  Age: 54 year old  : 1965  Referring provider: Referred Self    Problem List:  Sero-positive RA (-RF, -SUSHIL, +CCP low positive 60 in 2012 outside lab, X-rays 3/2014 No erosions)    Assessment and Plan:  The patient relates stable disease process, though she did sustain an acute injury to her right knee within the past few days. Blood work from 2019 showed stable blood counts, creatinine, and liver tests were normal, MPV was elevated at 11.6, sed rate was 34, and CRP was 0.54.    Seropositive rheumatoid arthritis seems to be stable presently. We could try Rasuvo as generic methotrexate is not being covered by her insurance company. She may try going down from 0.9 mL methotrexate to 0.8 mL weekly, but I do not think this is vital at this time. She could do this because her X-rays don't show damage, but she does show borderline inflammation, so I would be careful about decreasing her dosage.     With regard to her current knee pain, which is chronic and degenerative in nature, though she did acutely exacerbate it while going down stairs, she may want to pursue Orthopedic surgical consultation .    Follow-up: In 4-6 months with Lj Robledo.    HPI:   Jodie Dailey is a 54 year old female with a history including seropositive rheumatoid arthritis and leukocytoclastic vasculitis who presents for follow-up. The patient was last seen in Rheumatology by Lj Pro on 2019, at which time reported swelling and pain to the right hand/fingers and left hand. The plan was to continue Imuran 100 mg once daily with folic acid 1 mg daily  with intent to consider trial of Orencia.      Today, she reports that she feels fairly stable though her right lateral hip and right knee have been somewhat bothersome. She states that she may have hyperextending her right knee while walking down stairs a few days ago. The patient denies morning stiffness, but she does have around 5 minutes of stiffness after periods of prolonged sitting.     She notes that she was told by an Orthopedic provider that her right knee was highly arthritic years ago. She adds that she had surgery on her right 5th metatarsal cyst resection.     She states that she sent a letter to her insurance company in May to appeal for methotrexate coverage, but this was denied. She does continue to take methotrexate 0.9 mL weekly.      Review of Systems:   Pertinent items are noted in HPI or as below, remainder of complete ROS is negative.      No recent problems with hearing or vision. No swallowing problems.   No breathing difficulty, shortness of breath, coughing, or wheezing.  No chest pain or palpitations.  No heart burn, indigestion, abdominal pain, nausea, vomiting, diarrhea.  No urination problems, no bloody, cloudy urine, no dysuria.  No numbing, tingling, weakness.  No headaches or confusion.  No rashes. No easy bleeding or bruising.     Active Medications:   Current Outpatient Medications:      azaTHIOprine (IMURAN) 50 MG tablet, TAKE 2 TABLETS BY MOUTH  DAILY  LABS DUE NOW AND EVERY 8-12 WEEKS, Disp: 180 tablet, Rfl: 0     Calcium Carbonate-Vitamin D (CALCIUM 500 + D PO), Take 1 tablet by mouth daily., Disp: , Rfl:      Cyanocobalamin (VITAMIN B12 PO), Take 1 tablet by mouth daily., Disp: , Rfl:      folic acid (FOLVITE) 1 MG tablet, TAKE 1 TABLET BY MOUTH TWO  TIMES DAILY, Disp: 180 tablet, Rfl: 3     hydrochlorothiazide (HYDRODIURIL) 25 MG tablet, Take 1 tablet by mouth daily., Disp: , Rfl:      methotrexate sodium, pres-free, 50 MG/2ML SOLN injection CHEMO, Inject 0.9 mLs  "Subcutaneous once a week, Disp: , Rfl: 0     Pyridoxine HCl (B-6 PO), Take 1 tablet by mouth daily., Disp: , Rfl:      Syringe/Needle, Disp, 27G X 1/2\" 1 ML MISC, 1 Syringe once a week Use as directed for weekly injections for Methotrexate, Disp: 12 each, Rfl: 1     valACYclovir (VALTREX) 1000 mg tablet, Take 1 g by mouth, Disp: , Rfl:     Allergies:   TNF antagonists  Seasonal allergies     Past Medical History:  Anemia   Basal cell cancer   Benign neoplasm of breast   Hypertension   Left ankle injury   Left knee degenerative joint disease   Leukocytoclastic vasculitis   Nasal polyps   Obesity  Pleural effusions   Pneumonia   Premenopausal patient   Pure hypercholesterolemia   Iron deficiency anemia  Lyme disease  Chronic sinusitis  Restless legs syndrome  Endocrine disorder  Hemorrhoids  History of basal cell carcinoma  Rheumatoid arthritis involving multiple sites with positive rheumatoid factor  Positive anti-CCP test    Past Surgical History:  C Stereotactic breast biopsy, left 01/28/1999  Excision benign skin lesion trunk/arm/leg, right axilla 01/28/1999  Excision breast lesion open, left 01/28/1999  Place need wire preop, breast, left 01/28/1999  Right knee surgery [id number]  Sinus surgery, polyps removed 2001    Family History:    No autoimmune disorders, psoriasis, UC, crohn's, SLE, RA, PsA, gout.  No MS or cancer in family  Mother-lymes, macular degeneration   Father-healthy  Brother-celiac  Niece-celiac   PGM/PGF-heart disease  MGM-cancer tumor lung      Social History:  The patient reports that she has never smoked. She has never used smokeless tobacco. She reports that she does not drink alcohol or use drugs. Right-handed.   PCP: Marga Turner  Marital Status:      Physical Exam:   BP (!) 147/85   Pulse 87   Temp 98.2  F (36.8  C) (Oral)   Ht 1.626 m (5' 4\")   Wt 97.2 kg (214 lb 3.2 oz)   LMP 03/02/2002   SpO2 96%   BMI 36.77 kg/m      Wt Readings from Last 4 Encounters:   11/19/19 " 97.2 kg (214 lb 3.2 oz)   05/09/19 100.4 kg (221 lb 6.4 oz)   06/04/18 96.2 kg (212 lb)   04/18/18 96 kg (211 lb 11.2 oz)     Constitutional: Well-developed, appearing stated age; cooperative.  Eyes: Normal EOM, PERRLA, vision, conjunctiva, sclera.  ENT: Normal external ears, nose, hearing, lips, teeth, gums, throat. No mucous membrane lesions, normal saliva pool.  Neck: No mass or thyroid enlargement.  Resp: Lungs clear to auscultation, nl to palpation.  CV: RRR, no murmurs, rubs or gallops, no edema.  GI: No ABD mass or tenderness, no HSM.  : Not tested.  Lymph: No cervical, supraclavicular, inguinal or epitrochlear nodes.  MS: The TMJ, neck, shoulder, elbow, wrist, MCP/PIP/DIP, spine, hip, knee, ankle, and foot MTP/IP joints were examined and found normal. No active synovitis or altered joint anatomy. Full joint ROM. Normal  strength. No dactylitis, tenosynovitis, enthesopathy.  Skin: No nail pitting, alopecia, rash, nodules or lesions  Neuro: Normal cranial nerves, strength, sensation, DTRs.   Psych: Normal judgement, orientation, memory, affect.     Imaging:  XR Wrist Bilateral (05/09/2019):  1. No acute osseous abnormality.  2. No visualized erosive changes.    XR Hand Bilateral (05/09/2019):  1. No acute osseous abnormality.  2. No erosive changes.    XR Foot Bilateral (05/09/2019):  1. No acute osseous abnormality.  2. Periarticular erosive changes at the base of the fifth right  metatarsophalangeal joint consistent with inflammatory arthritis.   3. The irregular straightening of the lateral aspect of the fifth  metatarsal heads may be related to prior surgery, recommend clinical  Correlation.    Laboratory:   RHEUM RESULTS Latest Ref Rng & Units 12/31/2018 4/2/2019 5/9/2019   COMPLEMENT C3 90 - 200 mg/dL - - -   COMPLEMENT C4 15 - 50 mg/dL - - -   DNA-DS IU/mL - - -   SED RATE <30 mm/hr 31 32(A) -   CRP, INFLAMMATION <0.50 0.31 0.42 -   CYCLIC CIT PEPT IGG <5 U/mL - - -   RHEUMATOID FACTOR <20 IU/mL -  - 29(H)   SUSHIL SCREEN BY EIA <1.0 - - -   AST 2 - 40 U/L 27 25 -   ALT 8 - 45 U/L 19 17 -   ALBUMIN 3.5 - 5.2 g/dL 3.9 3.9 -   WBC 4.5 - 11.0 10:9/L 4.5 4.4(A) -   RBC 4.00 - 5.20 10:12/L 4.51 4.45 -   HGB 12.0 - 16.0 g/dL 14.1 13.7 -   HCT 33.0 - 51.0 % 41.2 40.4 -   MCV 80 - 100 fl 91 91 -   MCHC 32.0 - 36.0 g/dL 34.2 33.9 -   RDW 11.5 - 15.5 % 13.0 13.1 -    - 440 10:9/L - - -   CREATININE 0.57 - 1.11 mg/dL 0.76 0.78 -   GFR ESTIMATE, IF BLACK ml/min/1.73m2 >60 >60 -   GFR ESTIMATE ml/min/1.73m2 >60 >60 -    - 1620 mg/dL - - -   IGA 70 - 380 mg/dL - - -   IGM 60 - 265 mg/dL - - -   HEPATITIS C ANTIBODY NR:Nonreactive - - Nonreactive     Rheumatoid Factor   Date Value Ref Range Status   05/09/2019 29 (H) <20 IU/mL Final     Cyclic Citrullinated Peptide Antibody, IgG   Date Value Ref Range Status   05/09/2019 >340 (H) <7 U/mL Final     Comment:     Positive     Cyclic Cit Pept IgG/IgA   Date Value Ref Range Status   05/15/2012 60.60  Final     Cyclic Citrullinated Peptide IgG   Date Value Ref Range Status   08/18/2009 43 (H) <5 U/mL Final     Comment:     Interpretation:  Positive     Scleroderma Antibody Scl-70 BETH IgG   Date Value Ref Range Status   05/09/2019 0.5 0.0 - 0.9 AI Final     Comment:     Negative  Antibody index (AI) values reflect qualitative changes in antibody   concentration that cannot be directly associated with clinical condition or   disease state.       SSA (Ro) (BETH) Antibody, IgG   Date Value Ref Range Status   05/09/2019 <0.2 0.0 - 0.9 AI Final     Comment:     Negative  Antibody index (AI) values reflect qualitative changes in antibody   concentration that cannot be directly associated with clinical condition or   disease state.       SSB (La) (BETH) Antibody, IgG   Date Value Ref Range Status   05/09/2019 <0.2 0.0 - 0.9 AI Final     Comment:     Negative  Antibody index (AI) values reflect qualitative changes in antibody   concentration that cannot be directly associated with  clinical condition or   disease state.       SSA (RO) Antibody IgG   Date Value Ref Range Status   03/04/2008 3  Final     Comment:     Reference range: <     40  Unit: AU/mL  (Note)  REFERENCE INTERVALS: SSA (Ro) (BETH) Ab, IgG     29 AU/mL or Less............... Negative     30 - 40 AU/mL ................. Equivocal     41 AU/mL or Greater ........... Positive    SSA (Ro) antibody is seen in 70-75% of Sjogren syndrome  cases, 30-40% of systemic lupus erythematosus, and  5-10% of progressive systemic sclerosis.  Performed by [facility],  [address], [place] [id number] [phone]  [email or web address],  Mat Snow MD - Lab. Director     SSB (LA) Antibody IgG   Date Value Ref Range Status   03/04/2008 0  Final     Comment:     Reference range: <     40  Unit: AU/mL  (Note)  REFERENCE INTERVALS: SSB (La) (BETH) Ab, IgG     29 AU/mL or Less............... Negative     30 - 40 AU/mL ................. Equivocal     41 AU/mL or Greater ........... Positive    SSB (La) antibody is seen in 50-60% of Sjogren syndrome  cases and is specific if it is the only BETH antibody  present. Fifteen-25% of systemic lupus erythematosus and  5-10% of progressive systemic sclerosis also have this  antibody.  Performed by [facility],  [address] [place] [address] [phone]  [email or web address],  Mat Snow MD - Lab. Director     SUSHIL interpretation   Date Value Ref Range Status   05/09/2019 Negative NEG^Negative Final     Comment:                                        Reference range:  <1:40  NEGATIVE  1:40 - 1:80  BORDERLINE POSITIVE  >1:80 POSITIVE       SUSHIL Screen by EIA   Date Value Ref Range Status   03/13/2014 <1.0  Interpretation:  Negative <1.0 Final     DNA-ds   Date Value Ref Range Status   03/04/2008 5 IU/mL Final     Comment:     Interpretation:  Negative     Hepatitis B Core Sarah   Date Value Ref Range Status   05/09/2019 Nonreactive NR^Nonreactive Final     Hep B Surface Agn   Date Value Ref Range  Status   05/09/2019 Nonreactive NR^Nonreactive Final     Quantiferon-TB Gold Plus Result   Date Value Ref Range Status   05/09/2019 Negative NEG^Negative Final     Comment:     No interferon gamma response to M.tuberculosis antigens was detected.   Infection with M.tuberculosis is unlikely, however a single negative result   does not exclude infection. In patients at high risk for infection, a second   test should be considered  in accordance with the 2017 ATS/IDSA/CDC Clinical Practice Guidelines for   Diagnosis of Tuberculosis in Adults and Children [Micah NOBLES et   al.Clin.Infect.Dis. 2017 64(2):111-115].       TB1 Ag minus Nil Value   Date Value Ref Range Status   05/09/2019 0.02 IU/mL Final     TB2 Ag minus Nil Value   Date Value Ref Range Status   05/09/2019 0.01 IU/mL Final     Mitogen minus Nil Result   Date Value Ref Range Status   05/09/2019 >10.00 IU/mL Final     Nil Result   Date Value Ref Range Status   05/09/2019 0.12 IU/mL Final     Neutrophil Cytoplasmic IgG Antibody   Date Value Ref Range Status   03/04/2008 < 1:20  Final     Comment:     Reference range: < 1:20  (Note)  TEST INFORMATION: Anti-Neutrophil Cyto Ab, IgG  Neutrophil Cytoplasmic Antibodies (C-ANCA = granular  cytoplasmic staining, P-ANCA = perinuclear staining) are  found in the serum of over 90% of patients with certain  necrotizing systemic vasculitides, and usually in less  than 5% of patients with collagen vascular disease or  arthritis.                  ANCA'S IN VASCULITIC SYNDROMES                         Approx %      Approx % of                         Pos ANCA    patients demonstrating  ----------------------------------------------------------                (combined patterns)   C Pattern   P Pattern  Wegener's granulomatosis:  -Active Generalized   85 - 92 85 - 90       2 - 5  -Limited forms        60 - 67        85 - 90       2 - 5  -Inactive             30 - 35        85 - 90       2 - 5  Idiopathic  Crescentic  Glomerulonephritis      80            some*     majority*  Polyarteritis Nodosa    50             86           14  Churg - Lion         50             80           20  SLE                   less than 10     0    greater than 90  Rheumatoid Arthritis  less than 5      0    greater than 90  Sjogren's Syndrome        25           0    greater than 90  ----------------------------------------------------------  *No quantitation in the literature.  Performed by [facility],  [address] [place] [address] [phone]  [email or web address],  Mat Snow MD - Lab. Director                                                Albumin Fraction   Date Value Ref Range Status   10/20/2011 3.7 3.7 - 5.1 g/dL Final     Alpha 2 Fraction   Date Value Ref Range Status   10/20/2011 0.7 0.5 - 0.9 g/dL Final     Beta Fraction   Date Value Ref Range Status   10/20/2011 0.7 0.6 - 1.0 g/dL Final     Gamma Fraction   Date Value Ref Range Status   10/20/2011 1.0 0.7 - 1.6 g/dL Final     Monoclonal Peak   Date Value Ref Range Status   10/20/2011 0.0 0.0 g/dL Final     ELP Interpretation:   Date Value Ref Range Status   10/20/2011   Final    Essentially normal electrophoretic pattern.  No monoclonal protein seen.   Pathologic significance requires clinical correlation.  Cornell Lr M.D., Ph.D., Pathologist     Immunofixation ELP   Date Value Ref Range Status   10/20/2011   Final    No monoclonal protein seen on immunofixation.  Pathological significance   requires clinical correlation.   Cornell Lr M.D., Ph.D.     IGG   Date Value Ref Range Status   10/20/2011 1010 695 - 1620 mg/dL Final     IGA   Date Value Ref Range Status   10/20/2011 358 70 - 380 mg/dL Final     IGM   Date Value Ref Range Status   10/20/2011 85 60 - 265 mg/dL Final     Cintron BETH Antibody IgG   Date Value Ref Range Status   05/09/2019 <0.2 0.0 - 0.9 AI Final     Comment:     Negative  Antibody index (AI) values reflect qualitative  changes in antibody   concentration that cannot be directly associated with clinical condition or   disease state.       Scleroderma Antibody Scl-70 BETH IgG   Date Value Ref Range Status   05/09/2019 0.5 0.0 - 0.9 AI Final     Comment:     Negative  Antibody index (AI) values reflect qualitative changes in antibody   concentration that cannot be directly associated with clinical condition or   disease state.       Scribe Disclosure:  I, Raad Morrison, am serving as a scribe to document services personally performed by Ubaldo Montoya MD at this visit, based upon the provider's statements to me. All documentation has been reviewed by the aforementioned provider prior to being entered into the official medical record.    YG Montoya MD, PhD    Rheumatology        Again, thank you for allowing me to participate in the care of your patient.      Sincerely,    Ubaldo Montoya MD

## 2020-04-01 ENCOUNTER — TELEPHONE (OUTPATIENT)
Dept: RHEUMATOLOGY | Facility: CLINIC | Age: 55
End: 2020-04-01

## 2020-04-01 NOTE — TELEPHONE ENCOUNTER
" [facility]    Phone Message    May a detailed message be left on voicemail: yes     Reason for Call: Medication Question or concern regarding medication   Prescription Clarification  Name of Medication: Syringe/Needle, Disp, 27G X 1/2\" 1 ML MISC   Prescribing Provider: Lj Robledo   Pharmacy: [facility] [id number] [facility] [address]   What on the order needs clarification? Due to their distributor they do not have 27G available they only have 25G or 30G          Action Taken: Message routed to:  [facility] RHEUM     Travel Screening: Not Applicable                                                                      "

## 2020-04-02 NOTE — TELEPHONE ENCOUNTER
Returned call to pharmacy. Was told they have resolved the situation and found the 27 g 1/2 inch 1 ml syringe for pt. Nothing further needed from this clinic at this time.    PRAKASH OchaoN RN  Rheumatology Care Coordinator  [facility]

## 2020-04-30 DIAGNOSIS — M05.79 RHEUMATOID ARTHRITIS INVOLVING MULTIPLE SITES WITH POSITIVE RHEUMATOID FACTOR (H): Primary | ICD-10-CM

## 2020-04-30 DIAGNOSIS — R76.8 POSITIVE ANTI-CCP TEST: ICD-10-CM

## 2020-05-01 RX ORDER — METHOTREXATE 25 MG/ML
22.5 INJECTION INTRA-ARTERIAL; INTRAMUSCULAR; INTRATHECAL; INTRAVENOUS
Qty: 24 ML | Refills: 0 | Status: SHIPPED | OUTPATIENT
Start: 2020-05-01 | End: 2020-05-14

## 2020-05-06 DIAGNOSIS — M05.79 RHEUMATOID ARTHRITIS INVOLVING MULTIPLE SITES WITH POSITIVE RHEUMATOID FACTOR (H): ICD-10-CM

## 2020-05-06 DIAGNOSIS — Z79.899 ENCOUNTER FOR LONG-TERM CURRENT USE OF MEDICATION: ICD-10-CM

## 2020-05-07 NOTE — TELEPHONE ENCOUNTER
Script was sent 5/1/2020 to [facility] for 24ml with 0 refills.     Jessika Lockowod CMA   5/7/2020 1:25 PM

## 2020-05-07 NOTE — TELEPHONE ENCOUNTER
[facility]    Phone Message    May a detailed message be left on voicemail: yes     Reason for Call: Medication Refill Request    Has the patient contacted the pharmacy for the refill? Yes   Name of medication being requested: methotrexate sodium, pres-free, 50 MG/2ML SOLN injection  Provider who prescribed the medication: Meena  Pharmacy: [facility] on file  Date medication is needed: 5/7/2020         Action Taken: Message routed to:  [facility] Rheum    Travel Screening: Not Applicable

## 2020-05-13 ENCOUNTER — VIRTUAL VISIT (OUTPATIENT)
Dept: RHEUMATOLOGY | Facility: CLINIC | Age: 55
End: 2020-05-13
Attending: NURSE PRACTITIONER
Payer: COMMERCIAL

## 2020-05-13 DIAGNOSIS — Z53.9 NO SHOW: Primary | ICD-10-CM

## 2020-05-13 NOTE — PROGRESS NOTES
Called patient , no answer, left message , I will try to call patient again in 10/ 15 mins.  Nora Cintron, CMA

## 2020-05-13 NOTE — PATIENT INSTRUCTIONS
Annual dermatology visit    Advise you get pneumonia vaccines if you have not, talk to your primary care provider

## 2020-05-13 NOTE — PROGRESS NOTES
"Was not able to reach her. I tried 5 times.     Jodie Zuñiga is a 54 year old female who is being evaluated via a billable telephone visit.  Verified with 2 patient identifiers. Talked to patient     The patient has been notified of following:     \"This telephone visit will be conducted via a call between you and your physician/provider. We have found that certain health care needs can be provided without the need for a physical exam.  This service lets us provide the care you need with a short phone conversation.  If a prescription is necessary we can send it directly to your pharmacy.  If lab work is needed we can place an order for that and you can then stop by our lab to have the test done at a later time. Telephone visits are billed at different rates depending on your insurance coverage. During this emergency period, for some insurers they may be billed the same as an in-person visit.  Please reach out to your insurance provider with any questions.  If during the course of the call the physician/provider feels a telephone visit is not appropriate, you will not be charged for this service.\"    Patient has given verbal consent for Telephone visit?  Yes    Jodie Zuñiga complains of    Chief Complaint   Patient presents with     Telephone     called patient left message -      Follow Up     3 month        I have reviewed and updated the patient's Past Medical History, Social History, Family History and Medication List.    ALLERGIES  Tnf antagonists; No known allergies; and Seasonal allergies        Additional provider notes:   Jodie Dailey  is a 54 year old with sero-positive RA (Neg SUSHIL, +right 5th MTP erosions on xray 5-2919,  -RF/ low positive 60 CCP in 5/2012 then 5-2019 RF 29 +high CCP >340 -SUSHIL -BETH). XR 3-2014 no erosions hands or feet. Sees. Dr. Montoya last        Past: methotrexate 17.5mg week, azothioprine 100mg day [decreased 10/2011 from 150mg day] folic acid, adalimumab (humira) leukoclastic " vasculitis, oral methotrexate (hairloss)        Copy forward  May 9, 2019  No show to appt with Dr. Montoya   Cardiology appt 6-2018, atypical chest pain and hypertension.    Continues imuran 100 mg once a day. Tolerating. Labs 4-2019 only mild leukopenia. Reports since off this her arthritis is flaring. Right hand into fingers swelling with pain in tendons and joints, MCPs, and left hands. Right 5th MTP area of the old bunion surgery feels like it is broke, states had x-rays with [facility] a few years ago. Some pain over right 1st MTP underneath in the area of the pin. Left 2nd PIP swelling with pain. Thought felt lungs last week briefly. In menopause, some night sweats. Sees her dermatologist consultants once a year. No vasculitis since she was on adalimumab (humira) (legs many years ago). Denies any fever, chills, SOB, LARIOS, night sweats, or chest pain, or cough. Reports healthy. Prior to this even when methotrexate not covered, she did not feel her arthritis was controlled. Knees are still bad, does go to [facility]. History of basal skin cancer on thigh and face. Gelling of the knees. No infections in a long time     Off x 2 weeks methotrexate  0.9 ML or 22.5 mg once week SQ SUN injections, FA 1 mg day --insurance does not cover this. Not able to tolerate oral methotrexate.       May 13, 2020  Seen Dr. Montoya  no changes to x-rays only subtle right 5th, wean methotrexate from 0.9 ml 22.5 mg to 0.8 ml or 20 mg or try rasuvo    Right deejay ulcer, 3-2020 seeing eye specialist     Denies any fever, chills, SOB, LARIOS, night sweats, or chest pain, high fever, cough, travel in last 14 days or exposure to covid-19 (coronavirus). 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  No MS or cancer in family  Mother-lymes, macular degeneration   Father-healthy  Brother-celiac  Niece-celiac   PGM/PGF-heart disease  MGM-cancer tumor lung       SH:  No Alcohol. NO Smoking. No IVDU. Children. Right handed.       Review Of Systems    PMSH personally reviewed and updated by me.    Labs/Imaging:  I have reviewed EHR  3- normal CBCD, ESR, CRP, AST, ALT, Alb, Creat   5-2019 XR hands, wrists no erosions, osteoarthritis    5-2019 XR feet erosive changes right 5th; CXR negative     Assessment/Plan:  1. Sero-positive rheumatoid arthritis (RA) recent CCP >340 when in 2012 was low + but her x-rays only showed subtle erosion right 5th MTP. Future option trial of abatacept (orencia) 125 mg injection every 7 days. Continue imuran 100 mg once day. Given her history of leukocytoclastic vascultis with adalimumab (humira) will continue to have her see Dr. Montoya  --Azothioprine 100mg day, methotrexate 22.5 mg every 7 days, folic acid 1 mgday     --RTC 4-6 month with Dr. Montoya   2. Hx vasculitis and pleural effusions. HR CT if symptoms return or worsen. PFT 8-2018 normal ([facility]) but note mild reduced DLCO.   3. High risk medications. Labs every 12 weeks. Normal 3-2020   4. Right deejay ulcer, follow-up eye   5. Feet pain over areas of bunion surgery, may be mechanical or lose pins/screws. Will do x-rays and refer her to ortho foot specialist.   5. OA of the knees (needs TKR L>R). No synovitis.  [facility]  6. Hx of Basal Cell Carcinoma -recent recurrence. Get yearly checks     Orders:  No orders of the defined types were placed in this encounter.      I have reviewed the note as documented above.  This accurately captures the substance of my conversation with the patient. Instructions given to the patient including prescriptions, follow-up appointments and plan, follow-up labs and orders.       Phone call contact time  Call Started at   Call Ended at   Ready for check-out follow-up      Thank-you for allowing me to participate in your care.     Lj BUSBY, CNP, MSN  [facility]  Department of Rheumatology & Autoimmune Disorders  [facility]: [phone] Opt 2, then Opt 1 Scheduling     Education:   1. Immunization: Reviewed CDC guidelines with patient updated, information provided to patient based on CDC guidelines for vaccination recommendations, patient will discuss with primary provider  2. Bone Health:Educated on adequate calcium and vitamin D intake, Educated on exercise, Glucocorticoid education discussed risks, benefits & potential long term side effects from use per primary provider  3. Discussed routine annual physicals, cancer screening, cardiac risk monitoring, bone health and primary care with primary care provider. Also, educated glucocorticoid increase change of infections including shingles.   4. Educated on diagnosis, prognosis, labs, imaging, treatment options (including risks, benefits, risk of no treatment), medications (use, dose, side-effects, risks of medications including infection/cancer/bone marrow suppression, lab monitoring), infections what to do, plan of cares, goals of treatment. Clinic information given.    5. Educated in Rheumatology we do not prescribe narcotics or manage chronic pain, the patient will need to discuss with primary care or go to a pain clinic for management.   6. Discussed corovid-19  prevention, CDC guidelines/resources and what to do for exposure signs or symptoms and where to go, to follow CDC/MDH guidelines.     This patient was a no show for this scheduled appointment.

## 2020-05-14 ENCOUNTER — DOCUMENTATION ONLY (OUTPATIENT)
Dept: CARE COORDINATION | Facility: CLINIC | Age: 55
End: 2020-05-14

## 2020-05-15 ENCOUNTER — TELEPHONE (OUTPATIENT)
Dept: OPHTHALMOLOGY | Facility: CLINIC | Age: 55
End: 2020-05-15

## 2020-05-15 NOTE — TELEPHONE ENCOUNTER
Left message at [phone] with direct number    Eye notes in system from march-- marginal K-ulcer may have resolved per march note and referred for K-sicca  Raúl Teresa RN 2:16 PM 05/15/20         [phone]  Left message with direct number at [phone]  Raúl Teresa RN 10:45 AM 05/15/20    --    PT has referral for OPHTHALMOLOGY with left corneal ulcer dx 3-2020 and history right corneal ulcer    Triage received referral message above    Will contact pt today  Raúl Teresa RN 10:31 AM 05/15/20

## 2020-06-02 ENCOUNTER — DOCUMENTATION ONLY (OUTPATIENT)
Dept: RHEUMATOLOGY | Facility: CLINIC | Age: 55
End: 2020-06-02

## 2020-06-02 NOTE — PROGRESS NOTES
Lab orders have been faxed to [facility] lab at [phone] per patient request.     Jessika Lockwood CMA   6/2/2020 11:00 AM

## 2020-07-06 DIAGNOSIS — M06.09 RHEUMATOID ARTHRITIS OF MULTIPLE SITES WITHOUT RHEUMATOID FACTOR (H): ICD-10-CM

## 2020-07-07 RX ORDER — AZATHIOPRINE 50 MG/1
100 TABLET ORAL DAILY
Qty: 60 TABLET | Refills: 0 | Status: SHIPPED | OUTPATIENT
Start: 2020-07-07 | End: 2021-01-03

## 2020-07-07 NOTE — TELEPHONE ENCOUNTER
azaTHIOprine (IMURAN) 50 MG tablet       Last Written Prescription Date:  11/4/2019  Last Fill Quantity: 180,   # refills: 0  Last Office Visit: 5/14/2020  Future Office visit:  none    CBC RESULTS:   Recent Labs   Lab Test 04/02/19  1459   WBC 4.4*   RBC 4.45   HGB 13.7   HCT 40.4   MCV 91   MCH 30.8   MCHC 33.9   RDW 13.1          Creatinine   Date Value Ref Range Status   04/02/2019 0.78 0.57 - 1.11 mg/dL Final   ]    Liver Function Studies -   Recent Labs   Lab Test 04/02/19  1459   ALBUMIN 3.9   AST 25   ALT 17       Routing refill request to provider for review/approval because:  DMARD medication.  - labs due *standing orders in chart.

## 2020-07-08 NOTE — TELEPHONE ENCOUNTER
Lab reminder sent to patient via [employer]. Task sent to clinic coordinators to assist patient with scheduling a lab appointment.     Jessika Lockwood CMA   7/8/2020 7:58 AM

## 2020-07-09 NOTE — TELEPHONE ENCOUNTER
Labs were done 7/3/2020 at [facility]. Results are in Care Everywhere.     Jessika Lockwood CMA   7/9/2020 7:46 AM

## 2020-11-04 DIAGNOSIS — M05.79 RHEUMATOID ARTHRITIS INVOLVING MULTIPLE SITES WITH POSITIVE RHEUMATOID FACTOR (H): Primary | ICD-10-CM

## 2020-11-06 RX ORDER — FOLIC ACID 1 MG/1
1 TABLET ORAL 2 TIMES DAILY
Qty: 180 TABLET | Refills: 2 | Status: SHIPPED | OUTPATIENT
Start: 2020-11-06 | End: 2021-11-11

## 2020-11-16 ENCOUNTER — HEALTH MAINTENANCE LETTER (OUTPATIENT)
Age: 55
End: 2020-11-16

## 2020-12-27 DIAGNOSIS — M05.79 RHEUMATOID ARTHRITIS, SEROPOSITIVE, MULTIPLE SITES (H): ICD-10-CM

## 2020-12-27 DIAGNOSIS — M05.79 RHEUMATOID ARTHRITIS INVOLVING MULTIPLE SITES WITH POSITIVE RHEUMATOID FACTOR (H): ICD-10-CM

## 2020-12-27 DIAGNOSIS — R76.8 POSITIVE ANTI-CCP TEST: ICD-10-CM

## 2020-12-27 DIAGNOSIS — M06.09 RHEUMATOID ARTHRITIS OF MULTIPLE SITES WITHOUT RHEUMATOID FACTOR (H): ICD-10-CM

## 2020-12-27 DIAGNOSIS — Z79.899 ENCOUNTER FOR LONG-TERM CURRENT USE OF MEDICATION: ICD-10-CM

## 2021-01-03 RX ORDER — NEEDLES, SAFETY 22GX1 1/2"
NEEDLE, DISPOSABLE MISCELLANEOUS
Qty: 12 EACH | Refills: 1 | OUTPATIENT
Start: 2021-01-03

## 2021-01-03 NOTE — TELEPHONE ENCOUNTER
" methotrexate sodium, pres-free, 50 MG/2ML SOLN injection  Last Written Prescription Date:  5/14/20  Last Fill Quantity: 24ml,   # refills: 0  Last Office Visit:  5/14/20  Future Office visit:  None    CBC RESULTS:   Recent Labs   Lab Test 04/02/19  1459   WBC 4.4*   RBC 4.45   HGB 13.7   HCT 40.4   MCV 91   MCH 30.8   MCHC 33.9   RDW 13.1          Creatinine   Date Value Ref Range Status   04/02/2019 0.78 0.57 - 1.11 mg/dL Final   ]    Liver Function Studies -   Recent Labs   Lab Test 04/02/19  1459   ALBUMIN 3.9   AST 25        17       azaTHIOprine (IMURAN) 50 MG tablet    Last Written Prescription Date:  7/7/20  Last Fill Quantity: 60,   # refills: 0    See epic labs more recent but past due    RTC 3 month     Scheduling has been notified to contact the pt for appointment.      Routing refill request to provider for review/approval because:labs populated past due. More recent in [facility] care everywhere but still past due \" Dr. Montoya-consider dose reduction\" methotrexate   appt past due  "

## 2021-01-03 NOTE — TELEPHONE ENCOUNTER
"5/14/2020  [facility] [place]   Lj Robledo APRN CNP  Rheumatology    Syringe/Needle, Disp, 27G X 1/2\" 1 ML MISC      "

## 2021-01-04 RX ORDER — AZATHIOPRINE 50 MG/1
TABLET ORAL
Qty: 180 TABLET | Refills: 0 | Status: SHIPPED | OUTPATIENT
Start: 2021-01-04 | End: 2021-01-29

## 2021-01-04 RX ORDER — METHOTREXATE 25 MG/ML
INJECTION INTRA-ARTERIAL; INTRAMUSCULAR; INTRATHECAL; INTRAVENOUS
Qty: 24 ML | Refills: 0 | Status: SHIPPED | OUTPATIENT
Start: 2021-01-04 | End: 2021-01-29

## 2021-01-04 NOTE — TELEPHONE ENCOUNTER
Care everywhere labs 7-2020 and 9-18    Normal creat, CBC diff, liver tests     Schedule follow-up  Dr. Montoya now and inform her labs are due --that she needs to let us know when they are done

## 2021-01-25 ENCOUNTER — TELEPHONE (OUTPATIENT)
Dept: RHEUMATOLOGY | Facility: CLINIC | Age: 56
End: 2021-01-25

## 2021-01-25 DIAGNOSIS — R76.8 POSITIVE ANTI-CCP TEST: ICD-10-CM

## 2021-01-25 DIAGNOSIS — M05.79 RHEUMATOID ARTHRITIS, SEROPOSITIVE, MULTIPLE SITES (H): ICD-10-CM

## 2021-01-25 DIAGNOSIS — M06.09 RHEUMATOID ARTHRITIS OF MULTIPLE SITES WITHOUT RHEUMATOID FACTOR (H): ICD-10-CM

## 2021-01-25 NOTE — TELEPHONE ENCOUNTER
Letter has been sent to Jodie to let her know that she will need to schedule an appointment for labs as well as followup with rheumatology for any additional refills.    Cassy Rowland MSN, RN  Rheumatology RN Care Coordinator  [facility]

## 2021-01-29 RX ORDER — AZATHIOPRINE 50 MG/1
TABLET ORAL
Qty: 180 TABLET | Refills: 0 | COMMUNITY
Start: 2021-01-29 | End: 2021-11-11

## 2021-01-29 RX ORDER — METHOTREXATE 25 MG/ML
INJECTION INTRA-ARTERIAL; INTRAMUSCULAR; INTRATHECAL; INTRAVENOUS
Qty: 24 ML | Refills: 0 | COMMUNITY
Start: 2021-01-29 | End: 2021-11-11

## 2021-03-12 ENCOUNTER — TELEPHONE (OUTPATIENT)
Dept: RHEUMATOLOGY | Facility: CLINIC | Age: 56
End: 2021-03-12

## 2021-03-12 DIAGNOSIS — M06.09 RHEUMATOID ARTHRITIS OF MULTIPLE SITES WITHOUT RHEUMATOID FACTOR (H): ICD-10-CM

## 2021-03-16 NOTE — TELEPHONE ENCOUNTER
Monitoring labs required every 8-12 weeks for medication refills.  AZATHIOPRINE  50MG  TAB      Last Written Prescription Date:  1/29/21  Last Fill Quantity: 180,   # refills: 0  Last Office Visit: 5/14/20 recommended 3 month follow up  Future Office visit:  None scheduled    CBC RESULTS:   Recent Labs   Lab Test 04/02/19  1459   WBC 4.4*   RBC 4.45   HGB 13.7   HCT 40.4   MCV 91   MCH 30.8   MCHC 33.9   RDW 13.1          Creatinine   Date Value Ref Range Status   04/02/2019 0.78 0.57 - 1.11 mg/dL Final   ]    Liver Function Studies -   Recent Labs   Lab Test 04/02/19  1459   ALBUMIN 3.9   AST 25   ALT 17       Routing refill request to provider for review/approval because:  Drug not on rheumatology refill protocol  Overdue for labs  Last labs care everywhere 9/18/20 [facility]

## 2021-03-17 RX ORDER — AZATHIOPRINE 50 MG/1
TABLET ORAL
Qty: 180 TABLET | OUTPATIENT
Start: 2021-03-17

## 2021-03-17 NOTE — TELEPHONE ENCOUNTER
Last seen me 5-2020, Dr. Ubaldo Montoya     Labs done 9-2020  Refill denied. Must see one of us and do labs (he and I are in person visits Thursdays). She should schedule with one of us now and the other 4 mth after     Notify her by jesse, call and letter   Notify pharmacy this is denied and I cant care for her if she does not do regular follow-ups     I do not see schedulers were notified or this sent to the Rheum RN pool

## 2021-03-17 NOTE — TELEPHONE ENCOUNTER
LVM for patient to call and schedule a Thursday PM in-person visit with Lj Robledo and a Thursday AM in-person appointment with Dr. Montoya.  3/17/21  LG

## 2021-03-19 ENCOUNTER — MYC MEDICAL ADVICE (OUTPATIENT)
Dept: RHEUMATOLOGY | Facility: CLINIC | Age: 56
End: 2021-03-19

## 2021-04-05 DIAGNOSIS — M05.79 RHEUMATOID ARTHRITIS INVOLVING MULTIPLE SITES WITH POSITIVE RHEUMATOID FACTOR (H): ICD-10-CM

## 2021-04-05 DIAGNOSIS — M05.79 RHEUMATOID ARTHRITIS, SEROPOSITIVE, MULTIPLE SITES (H): ICD-10-CM

## 2021-04-05 DIAGNOSIS — M06.09 RHEUMATOID ARTHRITIS OF MULTIPLE SITES WITHOUT RHEUMATOID FACTOR (H): ICD-10-CM

## 2021-04-05 DIAGNOSIS — R76.8 POSITIVE ANTI-CCP TEST: ICD-10-CM

## 2021-04-05 NOTE — TELEPHONE ENCOUNTER
IMURAN  Last Written Prescription Date:  1/29/2021  Last Fill Quantity: 180,   # refills:    FOLIC ACID  Last Written Prescription Date:  11/6/2020  Last Fill Quantity: 180,   # refills:2    METHOTREXATE  Last Written Prescription Date:  1/29/2021  Last Fill Quantity: 24mL   # refills 0      Last Office Visit: 5/14/2020  Future Office visit:  None scheduled    CBC RESULTS:   Recent Labs   Lab Test 04/02/19  1459   WBC 4.4*   RBC 4.45   HGB 13.7   HCT 40.4   MCV 91   MCH 30.8   MCHC 33.9   RDW 13.1          Creatinine   Date Value Ref Range Status   04/02/2019 0.78 0.57 - 1.11 mg/dL Final   ]    Liver Function Studies -   Recent Labs   Lab Test 04/02/19  1459   ALBUMIN 3.9   AST 25   ALT 17       Routing refill request to provider for review/approval because:  Needs provider approval.    Cassy Rowland MSN, RN  Rheumatology RN Care Coordinator  [facility]

## 2021-04-06 RX ORDER — AZATHIOPRINE 50 MG/1
TABLET ORAL
Qty: 180 TABLET | Refills: 0 | OUTPATIENT
Start: 2021-04-06

## 2021-04-06 RX ORDER — FOLIC ACID 1 MG/1
1 TABLET ORAL 2 TIMES DAILY
Qty: 180 TABLET | Refills: 2 | OUTPATIENT
Start: 2021-04-06

## 2021-04-06 RX ORDER — METHOTREXATE 25 MG/ML
INJECTION INTRA-ARTERIAL; INTRAMUSCULAR; INTRATHECAL; INTRAVENOUS
Qty: 24 ML | Refills: 0 | OUTPATIENT
Start: 2021-04-06

## 2021-04-21 NOTE — TELEPHONE ENCOUNTER
Send another letter to schedule & mychart   Add to scheduling only schedule with Dr. Ubaldo Montoya or MD     Do above and close

## 2021-09-18 ENCOUNTER — HEALTH MAINTENANCE LETTER (OUTPATIENT)
Age: 56
End: 2021-09-18

## 2021-10-17 ENCOUNTER — MYC MEDICAL ADVICE (OUTPATIENT)
Dept: RHEUMATOLOGY | Facility: CLINIC | Age: 56
End: 2021-10-17

## 2021-10-17 DIAGNOSIS — Z79.899 ENCOUNTER FOR LONG-TERM CURRENT USE OF MEDICATION: ICD-10-CM

## 2021-10-17 DIAGNOSIS — M06.09 RHEUMATOID ARTHRITIS OF MULTIPLE SITES WITHOUT RHEUMATOID FACTOR (H): Primary | ICD-10-CM

## 2021-10-20 NOTE — TELEPHONE ENCOUNTER
Labs reordered per standing orders and provider preferences. Shameka is currently on azathioprine and methotrexate according to med list.    Pt is overdue for follow up, needs to be seen. Last visit with Lj was on 5/13/20, and was to follow up in 3 months. [employer] message sent to pt with this information.    PRAKASH OchoaN, RN  Rheumatology Care Coordinator  [facility]

## 2021-10-22 ENCOUNTER — TELEPHONE (OUTPATIENT)
Dept: RHEUMATOLOGY | Facility: CLINIC | Age: 56
End: 2021-10-22
Payer: COMMERCIAL

## 2021-10-22 NOTE — TELEPHONE ENCOUNTER
Health Call Center    Phone Message    May a detailed message be left on voicemail: yes     Reason for Call: Other: Appointment to follow up and get med refills;  Also Pt is due for labs    Pt is calling trying to get an in person visit with Dr. Montoya.  Pt was not able to get an appointment with Dr. Montoya until Febraury of 2022.  Pt is hoping Dr. Montoya will be able to refill her prescriptions until she can be seen unless Dr. Montoya is able to get her in sooner.     Or If Dr. Montoya feels that a VV is sufficient, Pt is okay with that as well?    Please call the Pt back and let her know.    Pt was notified that lab orders was faxed to her [facility] per the [employer] message.    Action Taken: Message routed to:  [facility] Adult Rheum

## 2021-10-25 NOTE — TELEPHONE ENCOUNTER
Jodie is returning Samuel's call; she got a voicemail that Samuel was calling to confirm the fax number for her blood work orders.    Please call the Pt back, Pt would like to discuss about her medication refills and her follow up appt that is not until February 2022. Ok to leave a detailed message.

## 2021-11-09 NOTE — TELEPHONE ENCOUNTER
Called Jodie on 11/09/21 4:53 PM and left a message asking that pt return call. If patient calls back please, I ask that the Call center contact RN Care Coordinator at [phone] so that I can connect with pt.    Verify fax was received, and pt had questions about her refills.    FRANKIE Ochoa, RN  Rheumatology Care Coordinator  [facility]

## 2021-11-10 NOTE — TELEPHONE ENCOUNTER
Pt returned call and does know that the orders have been received by pt's local pharmacy.    Pt has scheduled a follow up appointment with Dr Montoya but it isn't until 2/22. Offered an in person appointment tomorrow, 11/11/21 at 2 pm. Pt accepted and appointment was scheduled.    Pt doesn't know if she'll be able to get the labs prior to this appointment, but will check.     FRANKIE Ochoa, RN  Rheumatology Care Coordinator  [facility]

## 2021-11-11 ENCOUNTER — OFFICE VISIT (OUTPATIENT)
Dept: PULMONOLOGY | Facility: CLINIC | Age: 56
End: 2021-11-11
Attending: INTERNAL MEDICINE
Payer: COMMERCIAL

## 2021-11-11 VITALS
OXYGEN SATURATION: 100 % | WEIGHT: 203 LBS | HEIGHT: 64 IN | HEART RATE: 67 BPM | BODY MASS INDEX: 34.66 KG/M2 | DIASTOLIC BLOOD PRESSURE: 77 MMHG | SYSTOLIC BLOOD PRESSURE: 117 MMHG | RESPIRATION RATE: 17 BRPM

## 2021-11-11 DIAGNOSIS — R76.8 POSITIVE ANTI-CCP TEST: ICD-10-CM

## 2021-11-11 DIAGNOSIS — Z79.899 ENCOUNTER FOR LONG-TERM CURRENT USE OF MEDICATION: ICD-10-CM

## 2021-11-11 DIAGNOSIS — M05.79 RHEUMATOID ARTHRITIS INVOLVING MULTIPLE SITES WITH POSITIVE RHEUMATOID FACTOR (H): Primary | ICD-10-CM

## 2021-11-11 DIAGNOSIS — M06.09 RHEUMATOID ARTHRITIS OF MULTIPLE SITES WITHOUT RHEUMATOID FACTOR (H): ICD-10-CM

## 2021-11-11 DIAGNOSIS — M05.79 RHEUMATOID ARTHRITIS, SEROPOSITIVE, MULTIPLE SITES (H): ICD-10-CM

## 2021-11-11 PROCEDURE — 99214 OFFICE O/P EST MOD 30 MIN: CPT | Performed by: INTERNAL MEDICINE

## 2021-11-11 PROCEDURE — G0463 HOSPITAL OUTPT CLINIC VISIT: HCPCS

## 2021-11-11 RX ORDER — METHOTREXATE 25 MG/ML
INJECTION INTRA-ARTERIAL; INTRAMUSCULAR; INTRATHECAL; INTRAVENOUS
Qty: 24 ML | Refills: 1 | Status: SHIPPED | OUTPATIENT
Start: 2021-11-11

## 2021-11-11 RX ORDER — ESTRADIOL 10 UG/1
10 INSERT VAGINAL
COMMUNITY
Start: 2021-01-28

## 2021-11-11 RX ORDER — AZATHIOPRINE 50 MG/1
TABLET ORAL
Qty: 180 TABLET | Refills: 1 | Status: SHIPPED | OUTPATIENT
Start: 2021-11-11

## 2021-11-11 RX ORDER — FOLIC ACID 1 MG/1
1 TABLET ORAL 2 TIMES DAILY
Qty: 180 TABLET | Refills: 3 | Status: SHIPPED | OUTPATIENT
Start: 2021-11-11

## 2021-11-11 ASSESSMENT — MIFFLIN-ST. JEOR: SCORE: 1495.8

## 2021-11-11 ASSESSMENT — PAIN SCALES - GENERAL: PAINLEVEL: MODERATE PAIN (4)

## 2021-11-11 NOTE — LETTER
2021         RE: Jodie Zuñiga  [address] [facility] [address]        Dear Colleague,    Thank you for referring your patient, Jodie Zuñiga, to the [facility] FOR LUNG [facility]ENCE AND [facility]. Please see a copy of my visit note below.    [facility]  Rheumatology Clinic  Ubaldo Montoya MD  2021     Name: Jodie Dailey  MRN: [id number]  Age: 54 year old  : 1965  Referring provider: Referred Self    Problem List:  Sero-positive RA (-RF, -SUSHIL, +CCP low positive 60 in 2012 outside lab, X-rays 3/2014 No erosions)    Assessment and Plan:  The patient relates stable disease process, though she did sustain an acute injury to her right knee within the past few days. Blood work from 2019 showed stable blood counts, creatinine, and liver tests were normal, MPV was elevated at 11.6, sed rate was 34, and CRP was 0.54.    Seropositive rheumatoid arthritis seems to be stable presently.   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    She does have plantar fasciitis in the right foot.    Question is whether that will improve with conservative therapy.  Have asked her to discuss that with the podiatrist.  If splinting and other conservative therapy options have not fix this, I would strongly consider stopping her azathioprine and adding sulfasalazine to her methotrexate.  I have had good results with sulfasalazine for plantar fasciitis in other patients and we discussed that.  We would need to watch for any recurrence of her leukocytoclastic vasculitis features obviously.    She will contact us with any worsening difficulties and if her plantar fasciitis does not resolve and she wants to try the sulfasalazine.  I will otherwise see her in 6 months sooner as needed with labs done at least once in the interim.    YG Montoya MD, PhD    Rheumatology      HPI:   Jodie Dailey is a 54 year old female with a  history including seropositive rheumatoid arthritis and leukocytoclastic vasculitis who presents for follow-up. The patient was last seen in Rheumatology by Lj Pro on 05/09/2019, at which time reported swelling and pain to the right hand/fingers and left hand. The plan was to continue Imuran 100 mg once daily with folic acid 1 mg daily with intent to consider trial of Orencia.      Today, she reports that she feels fairly stable though her right lateral hip and right knee have been somewhat bothersome. She states that she may have hyperextending her right knee while walking down stairs a few days ago. The patient denies morning stiffness, but she does have around 5 minutes of stiffness after periods of prolonged sitting.     She notes that she was told by an Orthopedic provider that her right knee was highly arthritic years ago. She adds that she had surgery on her right 5th metatarsal cyst resection.     She states that she sent a letter to her insurance company in May to appeal for methotrexate coverage, but this was denied. She does continue to take methotrexate 0.9 mL weekly.     November 11, 2021 interval history:    Since we have last seen the patient she has felt completely stable on her combination of methotrexate plus azathioprine.  Recall that she is on this combination because of the history of leukocytoclastic vasculitis and she has had no more of that.  She also has really no significant inflammatory morning stiffness in any of her joints.  Her labs have been stable.    The only real issue she has been having is some plantar fasciitis type symptoms in the base of her right foot.  She actually was seen by a podiatrist but they did not give her any splinting or anything like that.  She has not really improved much however over the last several months.         Review of Systems:   Pertinent items are noted in HPI or as below, remainder of complete ROS is negative.      No recent problems with hearing  "or vision. No swallowing problems.   No breathing difficulty, shortness of breath, coughing, or wheezing.  No chest pain or palpitations.  No heart burn, indigestion, abdominal pain, nausea, vomiting, diarrhea.  No urination problems, no bloody, cloudy urine, no dysuria.  No numbing, tingling, weakness.  No headaches or confusion.  No rashes. No easy bleeding or bruising.     Active Medications:   Current Outpatient Medications:      azaTHIOprine (IMURAN) 50 MG tablet, TAKE 2 TABLETS BY MOUTH  DAILY  LABS DUE NOW AND EVERY 8-12 WEEKS, Disp: 180 tablet, Rfl: 0     Calcium Carbonate-Vitamin D (CALCIUM 500 + D PO), Take 1 tablet by mouth daily., Disp: , Rfl:      Cyanocobalamin (VITAMIN B12 PO), Take 1 tablet by mouth daily., Disp: , Rfl:      folic acid (FOLVITE) 1 MG tablet, TAKE 1 TABLET BY MOUTH TWO  TIMES DAILY, Disp: 180 tablet, Rfl: 3     hydrochlorothiazide (HYDRODIURIL) 25 MG tablet, Take 1 tablet by mouth daily., Disp: , Rfl:      methotrexate sodium, pres-free, 50 MG/2ML SOLN injection CHEMO, Inject 0.9 mLs Subcutaneous once a week, Disp: , Rfl: 0     Pyridoxine HCl (B-6 PO), Take 1 tablet by mouth daily., Disp: , Rfl:      Syringe/Needle, Disp, 27G X 1/2\" 1 ML MISC, 1 Syringe once a week Use as directed for weekly injections for Methotrexate, Disp: 12 each, Rfl: 1     valACYclovir (VALTREX) 1000 mg tablet, Take 1 g by mouth, Disp: , Rfl:     Allergies:   TNF antagonists  Seasonal allergies     Past Medical History:  Anemia   Basal cell cancer   Benign neoplasm of breast   Hypertension   Left ankle injury   Left knee degenerative joint disease   Leukocytoclastic vasculitis   Nasal polyps   Obesity  Pleural effusions   Pneumonia   Premenopausal patient   Pure hypercholesterolemia   Iron deficiency anemia  Lyme disease  Chronic sinusitis  Restless legs syndrome  Endocrine disorder  Hemorrhoids  History of basal cell carcinoma  Rheumatoid arthritis involving multiple sites with positive rheumatoid " "factor  Positive anti-CCP test    Past Surgical History:  C Stereotactic breast biopsy, left 01/28/1999  Excision benign skin lesion trunk/arm/leg, right axilla 01/28/1999  Excision breast lesion open, left 01/28/1999  Place need wire preop, breast, left 01/28/1999  Right knee surgery [id number]  Sinus surgery, polyps removed 2001    Family History:    No autoimmune disorders, psoriasis, UC, crohn's, SLE, RA, PsA, gout.  No MS or cancer in family  Mother-lymes, macular degeneration   Father-healthy  Brother-celiac  Niece-celiac   PGM/PGF-heart disease  MGM-cancer tumor lung      Social History:  The patient reports that she has never smoked. She has never used smokeless tobacco. She reports that she does not drink alcohol or use drugs. Right-handed.   PCP: Marga Turner  Marital Status:      Physical Exam:   /77   Pulse 67   Resp 17   Ht 1.626 m (5' 4\")   Wt 92.1 kg (203 lb)   LMP 03/02/2002   SpO2 100%   BMI 34.84 kg/m     Wt Readings from Last 4 Encounters:   11/11/21 92.1 kg (203 lb)   11/19/19 97.2 kg (214 lb 3.2 oz)   05/09/19 100.4 kg (221 lb 6.4 oz)   06/04/18 96.2 kg (212 lb)     Constitutional: Well-developed, appearing stated age; cooperative.  Eyes: Normal EOM, PERRLA, vision, conjunctiva, sclera.  ENT: Normal external ears, nose, hearing, lips, teeth, gums, throat. No mucous membrane lesions, normal saliva pool.  Neck: No mass or thyroid enlargement.  Resp: Lungs clear to auscultation, nl to palpation.  CV: RRR, no murmurs, rubs or gallops, no edema.  GI: No ABD mass or tenderness, no HSM.  : Not tested.  Lymph: No cervical, supraclavicular, inguinal or epitrochlear nodes.  MS: The TMJ, neck, shoulder, elbow, wrist, MCP/PIP/DIP, spine, hip, knee, ankle, and foot MTP/IP joints were examined and found normal. No active synovitis or altered joint anatomy. Full joint ROM. Normal  strength. No dactylitis, tenosynovitis, enthesopathy, with the exception of right foot plantar " tenderness at the enthesis, consistent with plantar fasciitis..  Skin: No nail pitting, alopecia, rash, nodules or lesions  Neuro: Normal cranial nerves, strength, sensation, DTRs.   Psych: Normal judgement, orientation, memory, affect.     Imaging:  XR Wrist Bilateral (05/09/2019):  1. No acute osseous abnormality.  2. No visualized erosive changes.    XR Hand Bilateral (05/09/2019):  1. No acute osseous abnormality.  2. No erosive changes.    XR Foot Bilateral (05/09/2019):  1. No acute osseous abnormality.  2. Periarticular erosive changes at the base of the fifth right  metatarsophalangeal joint consistent with inflammatory arthritis.   3. The irregular straightening of the lateral aspect of the fifth  metatarsal heads may be related to prior surgery, recommend clinical  Correlation.    Laboratory:   RHEUM RESULTS Latest Ref Rng & Units 3/28/2002 3/4/2008 8/18/2009   ALBUMIN 3.5 - 5.2 g/dL - 3.5 3.6(L)   ALT 8 - 45 U/L - 14 11   AST 2 - 40 U/L - 25 28   ANCA - - < 1:20 -   SUSHIL INTERPRETATION NEG:Negative - - -   COMPLEMENT C3 90 - 200 mg/dL - 168 99   COMPLEMENT C4 15 - 50 mg/dL - 43 -   CREATININE 0.57 - 1.11 mg/dL - - 0.70   CRP <0.50 - 13.0(H) <5.0   DNA IU/mL - 5 -   MELANY <1.0 - <1.0 -   GFR ESTIMATE, IF BLACK ml/min/1.73m2 - - >90   GFR ESTIMATE ml/min/1.73m2 - - >90   HEMATOCRIT 33.0 - 51.0 % 36.2(A) - 38.2   HEMOGLOBIN 12.0 - 16.0 g/dL 12.3(A) - 13.2   HEPBANG NR:Nonreactive - Negative -   HCVAB NR:Nonreactive - Negative -   IGA 70 - 380 mg/dL - 386(H) -   IGM 60 - 265 mg/dL - 103 -    - 1620 mg/dL - 928 -   WBC 4.5 - 11.0 10:9/L 5.71 - 6.5   RBC 4.00 - 5.20 10:12/L 4.63 - 4.25   RDW 11.5 - 15.5 % 17.8(A) - 15.1(H)   MCHC 32.0 - 36.0 g/dL 34.0 - 34.6   MCV 80 - 100 fl 78.1(A) - 90   PLATELET COUNT 140 - 440 10:9/L - - 190   RHEUMATOID FACTOR <20 IU/mL - 9 -   ESR <30 mm/hr - 18 11   QUANTIFERON-TB GOLD PLUS RESULT NEG:Negative - - -     Rheumatoid Factor   Date Value Ref Range Status   05/09/2019  29 (H) <20 IU/mL Final     Cyclic Citrullinated Peptide Antibody, IgG   Date Value Ref Range Status   05/09/2019 >340 (H) <7 U/mL Final     Comment:     Positive     Cyclic Cit Pept IgG/IgA   Date Value Ref Range Status   05/15/2012 60.60  Final     Cyclic Citrullinated Peptide IgG   Date Value Ref Range Status   08/18/2009 43 (H) <5 U/mL Final     Comment:     Interpretation:  Positive     Scleroderma Antibody Scl-70 BETH IgG   Date Value Ref Range Status   05/09/2019 0.5 0.0 - 0.9 AI Final     Comment:     Negative  Antibody index (AI) values reflect qualitative changes in antibody   concentration that cannot be directly associated with clinical condition or   disease state.       SSA (Ro) (BETH) Antibody, IgG   Date Value Ref Range Status   05/09/2019 <0.2 0.0 - 0.9 AI Final     Comment:     Negative  Antibody index (AI) values reflect qualitative changes in antibody   concentration that cannot be directly associated with clinical condition or   disease state.       SSB (La) (BETH) Antibody, IgG   Date Value Ref Range Status   05/09/2019 <0.2 0.0 - 0.9 AI Final     Comment:     Negative  Antibody index (AI) values reflect qualitative changes in antibody   concentration that cannot be directly associated with clinical condition or   disease state.       SSA (RO) Antibody IgG   Date Value Ref Range Status   03/04/2008 3  Final     Comment:     Reference range: <     40  Unit: AU/mL  (Note)  REFERENCE INTERVALS: SSA (Ro) (BETH) Ab, IgG     29 AU/mL or Less............... Negative     30 - 40 AU/mL ................. Equivocal     41 AU/mL or Greater ........... Positive    SSA (Ro) antibody is seen in 70-75% of Sjogren syndrome  cases, 30-40% of systemic lupus erythematosus, and  5-10% of progressive systemic sclerosis.  Performed by [facility],  [address] [place] [id number] [phone]  [email or web address],  Mat Snow MD - Lab. Director     SSB (LA) Antibody IgG   Date Value Ref Range Status   03/04/2008  0  Final     Comment:     Reference range: <     40  Unit: AU/mL  (Note)  REFERENCE INTERVALS: SSB (La) (BETH) Ab, IgG     29 AU/mL or Less............... Negative     30 - 40 AU/mL ................. Equivocal     41 AU/mL or Greater ........... Positive    SSB (La) antibody is seen in 50-60% of Sjogren syndrome  cases and is specific if it is the only BETH antibody  present. Fifteen-25% of systemic lupus erythematosus and  5-10% of progressive systemic sclerosis also have this  antibody.  Performed by [facility],  [address] [place] [id number] [phone]  [email or web address],  Mat Snow MD - Lab. Director     SUSHIL interpretation   Date Value Ref Range Status   05/09/2019 Negative NEG^Negative Final     Comment:                                        Reference range:  <1:40  NEGATIVE  1:40 - 1:80  BORDERLINE POSITIVE  >1:80 POSITIVE       SUSHIL Screen by [facility]   Date Value Ref Range Status   03/13/2014 <1.0  Interpretation:  Negative <1.0 Final     DNA-ds   Date Value Ref Range Status   03/04/2008 5 IU/mL Final     Comment:     Interpretation:  Negative     Hepatitis B Core Sarah   Date Value Ref Range Status   05/09/2019 Nonreactive NR^Nonreactive Final     Hep B Surface Agn   Date Value Ref Range Status   05/09/2019 Nonreactive NR^Nonreactive Final     Quantiferon-TB Gold Plus Result   Date Value Ref Range Status   05/09/2019 Negative NEG^Negative Final     Comment:     No interferon gamma response to M.tuberculosis antigens was detected.   Infection with M.tuberculosis is unlikely, however a single negative result   does not exclude infection. In patients at high risk for infection, a second   test should be considered  in accordance with the 2017 ATS/IDSA/CDC Clinical Practice Guidelines for   Diagnosis of Tuberculosis in Adults and Children [Lewinsohn DM et   al.Clin.Infect.Dis. 2017 64(2):111-115].       TB1 Ag minus Nil Value   Date Value Ref Range Status   05/09/2019 0.02 IU/mL Final     TB2 Ag minus  Nil Value   Date Value Ref Range Status   05/09/2019 0.01 IU/mL Final     Mitogen minus Nil Result   Date Value Ref Range Status   05/09/2019 >10.00 IU/mL Final     Nil Result   Date Value Ref Range Status   05/09/2019 0.12 IU/mL Final     Neutrophil Cytoplasmic IgG Antibody   Date Value Ref Range Status   03/04/2008 < 1:20  Final     Comment:     Reference range: < 1:20  (Note)  TEST INFORMATION: Anti-Neutrophil Cyto Ab, IgG  Neutrophil Cytoplasmic Antibodies (C-ANCA = granular  cytoplasmic staining, P-ANCA = perinuclear staining) are  found in the serum of over 90% of patients with certain  necrotizing systemic vasculitides, and usually in less  than 5% of patients with collagen vascular disease or  arthritis.                  ANCA'S IN VASCULITIC SYNDROMES                         Approx %      Approx % of                         Pos ANCA    patients demonstrating  ----------------------------------------------------------                (combined patterns)   C Pattern   P Pattern  Wegener's granulomatosis:  -Active Generalized   85 - 92        85 - 90       2 - 5  -Limited forms        60 - 67        85 - 90       2 - 5  -Inactive             30 - 35        85 - 90       2 - 5  Idiopathic Crescentic  Glomerulonephritis      80            some*     majority*  Polyarteritis Nodosa    50             86           14  Churg - Lion         50             80           20  SLE                   less than 10     0    greater than 90  Rheumatoid Arthritis  less than 5      0    greater than 90  Sjogren's Syndrome        25           0    greater than 90  ----------------------------------------------------------  *No quantitation in the literature.  Performed by [facility],  [address] [place] [address] [phone]  [email or web address],  Mat Snow MD - Lab. Director                                                Albumin Fraction   Date Value Ref Range Status   10/20/2011 3.7 3.7 - 5.1 g/dL Final     Alpha  2 Fraction   Date Value Ref Range Status   10/20/2011 0.7 0.5 - 0.9 g/dL Final     Beta Fraction   Date Value Ref Range Status   10/20/2011 0.7 0.6 - 1.0 g/dL Final     Gamma Fraction   Date Value Ref Range Status   10/20/2011 1.0 0.7 - 1.6 g/dL Final     Monoclonal Peak   Date Value Ref Range Status   10/20/2011 0.0 0.0 g/dL Final     ELP Interpretation:   Date Value Ref Range Status   10/20/2011   Final    Essentially normal electrophoretic pattern.  No monoclonal protein seen.   Pathologic significance requires clinical correlation.  Cornell Lr M.D., Ph.D., Pathologist     Immunofixation ELP   Date Value Ref Range Status   10/20/2011   Final    No monoclonal protein seen on immunofixation.  Pathological significance   requires clinical correlation.   Cornell Lr M.D., Ph.D.     IGG   Date Value Ref Range Status   10/20/2011 1010 695 - 1620 mg/dL Final     IGA   Date Value Ref Range Status   10/20/2011 358 70 - 380 mg/dL Final     IGM   Date Value Ref Range Status   10/20/2011 85 60 - 265 mg/dL Final     Cintron BETH Antibody IgG   Date Value Ref Range Status   05/09/2019 <0.2 0.0 - 0.9 AI Final     Comment:     Negative  Antibody index (AI) values reflect qualitative changes in antibody   concentration that cannot be directly associated with clinical condition or   disease state.       Scleroderma Antibody Scl-70 BETH IgG   Date Value Ref Range Status   05/09/2019 0.5 0.0 - 0.9 AI Final     Comment:     Negative  Antibody index (AI) values reflect qualitative changes in antibody   concentration that cannot be directly associated with clinical condition or   disease state.       YG Montoya MD, PhD    Rheumatology

## 2021-11-11 NOTE — NURSING NOTE
Chief Complaint   Patient presents with     RECHECK     Return Scleroderma     Medications reviewed and vital signs taken.   Felix Mejia, CMA

## 2021-11-12 NOTE — PROGRESS NOTES
[facility]  Rheumatology Clinic  Ubaldo Montoya MD  2021     Name: Jodie Dailey  MRN: [id number]  Age: 54 year old  : 1965  Referring provider: Referred Self    Problem List:  Sero-positive RA (-RF, -SUSHIL, +CCP low positive 60 in 2012 outside lab, X-rays 3/2014 No erosions)    Assessment and Plan:  The patient relates stable disease process, though she did sustain an acute injury to her right knee within the past few days. Blood work from 2019 showed stable blood counts, creatinine, and liver tests were normal, MPV was elevated at 11.6, sed rate was 34, and CRP was 0.54.    Seropositive rheumatoid arthritis seems to be stable presently.   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    She does have plantar fasciitis in the right foot.    Question is whether that will improve with conservative therapy.  Have asked her to discuss that with the podiatrist.  If splinting and other conservative therapy options have not fix this, I would strongly consider stopping her azathioprine and adding sulfasalazine to her methotrexate.  I have had good results with sulfasalazine for plantar fasciitis in other patients and we discussed that.  We would need to watch for any recurrence of her leukocytoclastic vasculitis features obviously.    She will contact us with any worsening difficulties and if her plantar fasciitis does not resolve and she wants to try the sulfasalazine.  I will otherwise see her in 6 months sooner as needed with labs done at least once in the interim.    YG Montoya MD, PhD    Rheumatology      HPI:   Jodie Dailey is a 54 year old female with a history including seropositive rheumatoid arthritis and leukocytoclastic vasculitis who presents for follow-up. The patient was last seen in Rheumatology by Lj Pro on 2019, at which time reported swelling and pain to the right hand/fingers and left hand. The plan was to  continue Imuran 100 mg once daily with folic acid 1 mg daily with intent to consider trial of Orencia.      Today, she reports that she feels fairly stable though her right lateral hip and right knee have been somewhat bothersome. She states that she may have hyperextending her right knee while walking down stairs a few days ago. The patient denies morning stiffness, but she does have around 5 minutes of stiffness after periods of prolonged sitting.     She notes that she was told by an Orthopedic provider that her right knee was highly arthritic years ago. She adds that she had surgery on her right 5th metatarsal cyst resection.     She states that she sent a letter to her insurance company in May to appeal for methotrexate coverage, but this was denied. She does continue to take methotrexate 0.9 mL weekly.     November 11, 2021 interval history:    Since we have last seen the patient she has felt completely stable on her combination of methotrexate plus azathioprine.  Recall that she is on this combination because of the history of leukocytoclastic vasculitis and she has had no more of that.  She also has really no significant inflammatory morning stiffness in any of her joints.  Her labs have been stable.    The only real issue she has been having is some plantar fasciitis type symptoms in the base of her right foot.  She actually was seen by a podiatrist but they did not give her any splinting or anything like that.  She has not really improved much however over the last several months.         Review of Systems:   Pertinent items are noted in HPI or as below, remainder of complete ROS is negative.      No recent problems with hearing or vision. No swallowing problems.   No breathing difficulty, shortness of breath, coughing, or wheezing.  No chest pain or palpitations.  No heart burn, indigestion, abdominal pain, nausea, vomiting, diarrhea.  No urination problems, no bloody, cloudy urine, no dysuria.  No  "numbing, tingling, weakness.  No headaches or confusion.  No rashes. No easy bleeding or bruising.     Active Medications:   Current Outpatient Medications:      azaTHIOprine (IMURAN) 50 MG tablet, TAKE 2 TABLETS BY MOUTH  DAILY  LABS DUE NOW AND EVERY 8-12 WEEKS, Disp: 180 tablet, Rfl: 0     Calcium Carbonate-Vitamin D (CALCIUM 500 + D PO), Take 1 tablet by mouth daily., Disp: , Rfl:      Cyanocobalamin (VITAMIN B12 PO), Take 1 tablet by mouth daily., Disp: , Rfl:      folic acid (FOLVITE) 1 MG tablet, TAKE 1 TABLET BY MOUTH TWO  TIMES DAILY, Disp: 180 tablet, Rfl: 3     hydrochlorothiazide (HYDRODIURIL) 25 MG tablet, Take 1 tablet by mouth daily., Disp: , Rfl:      methotrexate sodium, pres-free, 50 MG/2ML SOLN injection CHEMO, Inject 0.9 mLs Subcutaneous once a week, Disp: , Rfl: 0     Pyridoxine HCl (B-6 PO), Take 1 tablet by mouth daily., Disp: , Rfl:      Syringe/Needle, Disp, 27G X 1/2\" 1 ML MISC, 1 Syringe once a week Use as directed for weekly injections for Methotrexate, Disp: 12 each, Rfl: 1     valACYclovir (VALTREX) 1000 mg tablet, Take 1 g by mouth, Disp: , Rfl:     Allergies:   TNF antagonists  Seasonal allergies     Past Medical History:  Anemia   Basal cell cancer   Benign neoplasm of breast   Hypertension   Left ankle injury   Left knee degenerative joint disease   Leukocytoclastic vasculitis   Nasal polyps   Obesity  Pleural effusions   Pneumonia   Premenopausal patient   Pure hypercholesterolemia   Iron deficiency anemia  Lyme disease  Chronic sinusitis  Restless legs syndrome  Endocrine disorder  Hemorrhoids  History of basal cell carcinoma  Rheumatoid arthritis involving multiple sites with positive rheumatoid factor  Positive anti-CCP test    Past Surgical History:  C Stereotactic breast biopsy, left 01/28/1999  Excision benign skin lesion trunk/arm/leg, right axilla 01/28/1999  Excision breast lesion open, left 01/28/1999  Place need wire preop, breast, left 01/28/1999  Right knee surgery " "[id number]  Sinus surgery, polyps removed 2001    Family History:    No autoimmune disorders, psoriasis, UC, crohn's, SLE, RA, PsA, gout.  No MS or cancer in family  Mother-lymes, macular degeneration   Father-healthy  Brother-celiac  Niece-celiac   PGM/PGF-heart disease  MGM-cancer tumor lung      Social History:  The patient reports that she has never smoked. She has never used smokeless tobacco. She reports that she does not drink alcohol or use drugs. Right-handed.   PCP: Marga Turner  Marital Status:      Physical Exam:   /77   Pulse 67   Resp 17   Ht 1.626 m (5' 4\")   Wt 92.1 kg (203 lb)   LMP 03/02/2002   SpO2 100%   BMI 34.84 kg/m     Wt Readings from Last 4 Encounters:   11/11/21 92.1 kg (203 lb)   11/19/19 97.2 kg (214 lb 3.2 oz)   05/09/19 100.4 kg (221 lb 6.4 oz)   06/04/18 96.2 kg (212 lb)     Constitutional: Well-developed, appearing stated age; cooperative.  Eyes: Normal EOM, PERRLA, vision, conjunctiva, sclera.  ENT: Normal external ears, nose, hearing, lips, teeth, gums, throat. No mucous membrane lesions, normal saliva pool.  Neck: No mass or thyroid enlargement.  Resp: Lungs clear to auscultation, nl to palpation.  CV: RRR, no murmurs, rubs or gallops, no edema.  GI: No ABD mass or tenderness, no HSM.  : Not tested.  Lymph: No cervical, supraclavicular, inguinal or epitrochlear nodes.  MS: The TMJ, neck, shoulder, elbow, wrist, MCP/PIP/DIP, spine, hip, knee, ankle, and foot MTP/IP joints were examined and found normal. No active synovitis or altered joint anatomy. Full joint ROM. Normal  strength. No dactylitis, tenosynovitis, enthesopathy, with the exception of right foot plantar tenderness at the enthesis, consistent with plantar fasciitis..  Skin: No nail pitting, alopecia, rash, nodules or lesions  Neuro: Normal cranial nerves, strength, sensation, DTRs.   Psych: Normal judgement, orientation, memory, affect.     Imaging:  XR Wrist Bilateral (05/09/2019):  1. " No acute osseous abnormality.  2. No visualized erosive changes.    XR Hand Bilateral (05/09/2019):  1. No acute osseous abnormality.  2. No erosive changes.    XR Foot Bilateral (05/09/2019):  1. No acute osseous abnormality.  2. Periarticular erosive changes at the base of the fifth right  metatarsophalangeal joint consistent with inflammatory arthritis.   3. The irregular straightening of the lateral aspect of the fifth  metatarsal heads may be related to prior surgery, recommend clinical  Correlation.    Laboratory:   RHEUM RESULTS Latest Ref Rng & Units 3/28/2002 3/4/2008 8/18/2009   ALBUMIN 3.5 - 5.2 g/dL - 3.5 3.6(L)   ALT 8 - 45 U/L - 14 11   AST 2 - 40 U/L - 25 28   ANCA - - < 1:20 -   SUSHIL INTERPRETATION NEG:Negative - - -   COMPLEMENT C3 90 - 200 mg/dL - 168 99   COMPLEMENT C4 15 - 50 mg/dL - 43 -   CREATININE 0.57 - 1.11 mg/dL - - 0.70   CRP <0.50 - 13.0(H) <5.0   DNA IU/mL - 5 -   MELANY <1.0 - <1.0 -   GFR ESTIMATE, IF BLACK ml/min/1.73m2 - - >90   GFR ESTIMATE ml/min/1.73m2 - - >90   HEMATOCRIT 33.0 - 51.0 % 36.2(A) - 38.2   HEMOGLOBIN 12.0 - 16.0 g/dL 12.3(A) - 13.2   HEPBANG NR:Nonreactive - Negative -   HCVAB NR:Nonreactive - Negative -   IGA 70 - 380 mg/dL - 386(H) -   IGM 60 - 265 mg/dL - 103 -    - 1620 mg/dL - 928 -   WBC 4.5 - 11.0 10:9/L 5.71 - 6.5   RBC 4.00 - 5.20 10:12/L 4.63 - 4.25   RDW 11.5 - 15.5 % 17.8(A) - 15.1(H)   MCHC 32.0 - 36.0 g/dL 34.0 - 34.6   MCV 80 - 100 fl 78.1(A) - 90   PLATELET COUNT 140 - 440 10:9/L - - 190   RHEUMATOID FACTOR <20 IU/mL - 9 -   ESR <30 mm/hr - 18 11   QUANTIFERON-TB GOLD PLUS RESULT NEG:Negative - - -     Rheumatoid Factor   Date Value Ref Range Status   05/09/2019 29 (H) <20 IU/mL Final     Cyclic Citrullinated Peptide Antibody, IgG   Date Value Ref Range Status   05/09/2019 >340 (H) <7 U/mL Final     Comment:     Positive     Cyclic Cit Pept IgG/IgA   Date Value Ref Range Status   05/15/2012 60.60  Final     Cyclic Citrullinated Peptide IgG   Date  Value Ref Range Status   08/18/2009 43 (H) <5 U/mL Final     Comment:     Interpretation:  Positive     Scleroderma Antibody Scl-70 BETH IgG   Date Value Ref Range Status   05/09/2019 0.5 0.0 - 0.9 AI Final     Comment:     Negative  Antibody index (AI) values reflect qualitative changes in antibody   concentration that cannot be directly associated with clinical condition or   disease state.       SSA (Ro) (BETH) Antibody, IgG   Date Value Ref Range Status   05/09/2019 <0.2 0.0 - 0.9 AI Final     Comment:     Negative  Antibody index (AI) values reflect qualitative changes in antibody   concentration that cannot be directly associated with clinical condition or   disease state.       SSB (La) (BETH) Antibody, IgG   Date Value Ref Range Status   05/09/2019 <0.2 0.0 - 0.9 AI Final     Comment:     Negative  Antibody index (AI) values reflect qualitative changes in antibody   concentration that cannot be directly associated with clinical condition or   disease state.       SSA (RO) Antibody IgG   Date Value Ref Range Status   03/04/2008 3  Final     Comment:     Reference range: <     40  Unit: AU/mL  (Note)  REFERENCE INTERVALS: SSA (Ro) (BETH) Ab, IgG     29 AU/mL or Less............... Negative     30 - 40 AU/mL ................. Equivocal     41 AU/mL or Greater ........... Positive    SSA (Ro) antibody is seen in 70-75% of Sjogren syndrome  cases, 30-40% of systemic lupus erythematosus, and  5-10% of progressive systemic sclerosis.  Performed by [facility],  [address] [place] [address] [phone]  [email or web address],  Mat Snow MD - Lab. Director     SSB (LA) Antibody IgG   Date Value Ref Range Status   03/04/2008 0  Final     Comment:     Reference range: <     40  Unit: AU/mL  (Note)  REFERENCE INTERVALS: SSB (La) (BETH) Ab, IgG     29 AU/mL or Less............... Negative     30 - 40 AU/mL ................. Equivocal     41 AU/mL or Greater ........... Positive    SSB (La) antibody is seen in  50-60% of Sjogren syndrome  cases and is specific if it is the only BETH antibody  present. Fifteen-25% of systemic lupus erythematosus and  5-10% of progressive systemic sclerosis also have this  antibody.  Performed by [facility]  [facility] [place] [address] [phone]  [email or web address],  Mat Snow MD - Lab. Director     SUSHIL interpretation   Date Value Ref Range Status   05/09/2019 Negative NEG^Negative Final     Comment:                                        Reference range:  <1:40  NEGATIVE  1:40 - 1:80  BORDERLINE POSITIVE  >1:80 POSITIVE       SUSHIL Screen by EIA   Date Value Ref Range Status   03/13/2014 <1.0  Interpretation:  Negative <1.0 Final     DNA-ds   Date Value Ref Range Status   03/04/2008 5 IU/mL Final     Comment:     Interpretation:  Negative     Hepatitis B Core Sarah   Date Value Ref Range Status   05/09/2019 Nonreactive NR^Nonreactive Final     Hep B Surface Agn   Date Value Ref Range Status   05/09/2019 Nonreactive NR^Nonreactive Final     Quantiferon-TB Gold Plus Result   Date Value Ref Range Status   05/09/2019 Negative NEG^Negative Final     Comment:     No interferon gamma response to M.tuberculosis antigens was detected.   Infection with M.tuberculosis is unlikely, however a single negative result   does not exclude infection. In patients at high risk for infection, a second   test should be considered  in accordance with the 2017 ATS/IDSA/CDC Clinical Practice Guidelines for   Diagnosis of Tuberculosis in Adults and Children [Lewinsohn GIULIANO et   al.Clin.Infect.Dis. 2017 64(2):111-115].       TB1 Ag minus Nil Value   Date Value Ref Range Status   05/09/2019 0.02 IU/mL Final     TB2 Ag minus Nil Value   Date Value Ref Range Status   05/09/2019 0.01 IU/mL Final     Mitogen minus Nil Result   Date Value Ref Range Status   05/09/2019 >10.00 IU/mL Final     Nil Result   Date Value Ref Range Status   05/09/2019 0.12 IU/mL Final     Neutrophil Cytoplasmic IgG Antibody   Date  Value Ref Range Status   03/04/2008 < 1:20  Final     Comment:     Reference range: < 1:20  (Note)  TEST INFORMATION: Anti-Neutrophil Cyto Ab, IgG  Neutrophil Cytoplasmic Antibodies (C-ANCA = granular  cytoplasmic staining, P-ANCA = perinuclear staining) are  found in the serum of over 90% of patients with certain  necrotizing systemic vasculitides, and usually in less  than 5% of patients with collagen vascular disease or  arthritis.                  ANCA'S IN VASCULITIC SYNDROMES                         Approx %      Approx % of                         Pos ANCA    patients demonstrating  ----------------------------------------------------------                (combined patterns)   C Pattern   P Pattern  Wegener's granulomatosis:  -Active Generalized   85 - 92 85 - 90       2 - 5  -Limited forms        60 - 67 85 - 90       2 - 5  -Inactive             30 - 35        85 - 90       2 - 5  Idiopathic Crescentic  Glomerulonephritis      80            some*     majority*  Polyarteritis Nodosa    50             86           14  Churg - Lion         50             80           20  SLE                   less than 10     0    greater than 90  Rheumatoid Arthritis  less than 5      0    greater than 90  Sjogren's Syndrome        25           0    greater than 90  ----------------------------------------------------------  *No quantitation in the literature.  Performed by [facility],  [address] [place] [address] [phone]  [email or web address],  Mat Snow MD - Lab. Director                                                Albumin Fraction   Date Value Ref Range Status   10/20/2011 3.7 3.7 - 5.1 g/dL Final     Alpha 2 Fraction   Date Value Ref Range Status   10/20/2011 0.7 0.5 - 0.9 g/dL Final     Beta Fraction   Date Value Ref Range Status   10/20/2011 0.7 0.6 - 1.0 g/dL Final     Gamma Fraction   Date Value Ref Range Status   10/20/2011 1.0 0.7 - 1.6 g/dL Final     Monoclonal Peak   Date  Value Ref Range Status   10/20/2011 0.0 0.0 g/dL Final     ELP Interpretation:   Date Value Ref Range Status   10/20/2011   Final    Essentially normal electrophoretic pattern.  No monoclonal protein seen.   Pathologic significance requires clinical correlation.  Cornell Lr M.D., Ph.D., Pathologist     Immunofixation ELP   Date Value Ref Range Status   10/20/2011   Final    No monoclonal protein seen on immunofixation.  Pathological significance   requires clinical correlation.   oCrnell Lr M.D., Ph.D.     IGG   Date Value Ref Range Status   10/20/2011 1010 695 - 1620 mg/dL Final     IGA   Date Value Ref Range Status   10/20/2011 358 70 - 380 mg/dL Final     IGM   Date Value Ref Range Status   10/20/2011 85 60 - 265 mg/dL Final     Cintron BETH Antibody IgG   Date Value Ref Range Status   05/09/2019 <0.2 0.0 - 0.9 AI Final     Comment:     Negative  Antibody index (AI) values reflect qualitative changes in antibody   concentration that cannot be directly associated with clinical condition or   disease state.       Scleroderma Antibody Scl-70 BETH IgG   Date Value Ref Range Status   05/09/2019 0.5 0.0 - 0.9 AI Final     Comment:     Negative  Antibody index (AI) values reflect qualitative changes in antibody   concentration that cannot be directly associated with clinical condition or   disease state.           YG Montoya MD, PhD    Rheumatology

## 2022-01-08 ENCOUNTER — HEALTH MAINTENANCE LETTER (OUTPATIENT)
Age: 57
End: 2022-01-08

## 2022-11-20 ENCOUNTER — HEALTH MAINTENANCE LETTER (OUTPATIENT)
Age: 57
End: 2022-11-20

## 2023-04-15 ENCOUNTER — HEALTH MAINTENANCE LETTER (OUTPATIENT)
Age: 58
End: 2023-04-15

## 2023-05-18 ENCOUNTER — LAB REQUISITION (OUTPATIENT)
Dept: LAB | Facility: CLINIC | Age: 58
End: 2023-05-18
Payer: COMMERCIAL

## 2023-05-18 DIAGNOSIS — M79.672 PAIN IN LEFT FOOT: ICD-10-CM

## 2023-05-18 PROCEDURE — 88305 TISSUE EXAM BY PATHOLOGIST: CPT | Mod: TC,ORL | Performed by: ORTHOPAEDIC SURGERY

## 2023-05-18 PROCEDURE — 87102 FUNGUS ISOLATION CULTURE: CPT | Mod: ORL | Performed by: ORTHOPAEDIC SURGERY

## 2023-05-18 PROCEDURE — 87070 CULTURE OTHR SPECIMN AEROBIC: CPT | Mod: ORL | Performed by: ORTHOPAEDIC SURGERY

## 2023-05-18 PROCEDURE — 87205 SMEAR GRAM STAIN: CPT | Mod: ORL | Performed by: ORTHOPAEDIC SURGERY

## 2023-05-18 PROCEDURE — 87075 CULTR BACTERIA EXCEPT BLOOD: CPT | Mod: ORL | Performed by: ORTHOPAEDIC SURGERY

## 2023-05-23 LAB
PATH REPORT.COMMENTS IMP SPEC: NORMAL
PATH REPORT.COMMENTS IMP SPEC: NORMAL
PATH REPORT.FINAL DX SPEC: NORMAL
PATH REPORT.GROSS SPEC: NORMAL
PATH REPORT.MICROSCOPIC SPEC OTHER STN: NORMAL
PATH REPORT.RELEVANT HX SPEC: NORMAL
PHOTO IMAGE: NORMAL

## 2023-05-23 PROCEDURE — 88305 TISSUE EXAM BY PATHOLOGIST: CPT | Mod: 26 | Performed by: PATHOLOGY

## 2024-01-28 ENCOUNTER — HEALTH MAINTENANCE LETTER (OUTPATIENT)
Age: 59
End: 2024-01-28

## 2024-06-16 ENCOUNTER — HEALTH MAINTENANCE LETTER (OUTPATIENT)
Age: 59
End: 2024-06-16

## 2025-06-23 NOTE — TELEPHONE ENCOUNTER
methotrexate sodium, pres-free, 50 MG/2ML SOLN injection CHEMO  Last Written Prescription Date:  Unknown  Last Fill Quantity: unknown,   # refills: unknown  Last Office Visit: 11/19/19  Future Office visit: none    CBC RESULTS:   Recent Labs   Lab Test 04/02/19  1459   WBC 4.4*   RBC 4.45   HGB 13.7   HCT 40.4   MCV 91   MCH 30.8   MCHC 33.9   RDW 13.1          Creatinine   Date Value Ref Range Status   04/02/2019 0.78 0.57 - 1.11 mg/dL Final   ]    Liver Function Studies -   Recent Labs   Lab Test 04/02/19  1459  03/02/12   ALBUMIN 3.9   < > 3.4*   ALKPHOS  --   --  87   AST 25   < > 19   ALT 17   < > 31    < > = values in this interval not displayed.     Scheduling has been notified to contact the pt for appointment.      Routing refill request to provider for review/approval because: last past due. Due for appt now. Historical. dosing alert.         There are no Wet Read(s) to document.